# Patient Record
Sex: FEMALE | Race: BLACK OR AFRICAN AMERICAN | Employment: UNEMPLOYED | URBAN - METROPOLITAN AREA
[De-identification: names, ages, dates, MRNs, and addresses within clinical notes are randomized per-mention and may not be internally consistent; named-entity substitution may affect disease eponyms.]

---

## 2020-07-20 ENCOUNTER — HOSPITAL ENCOUNTER (EMERGENCY)
Age: 65
Discharge: PSYCHIATRIC OR SUBSTANCE USE FACILITY | End: 2020-07-21
Attending: EMERGENCY MEDICINE | Admitting: EMERGENCY MEDICINE
Payer: MEDICARE

## 2020-07-20 DIAGNOSIS — F31.2 BIPOLAR AFFECTIVE DISORDER, CURRENT EPISODE MANIC WITH PSYCHOTIC SYMPTOMS (HCC): Primary | ICD-10-CM

## 2020-07-20 DIAGNOSIS — E87.6 HYPOKALEMIA: ICD-10-CM

## 2020-07-20 PROCEDURE — 99285 EMERGENCY DEPT VISIT HI MDM: CPT

## 2020-07-20 PROCEDURE — 90791 PSYCH DIAGNOSTIC EVALUATION: CPT

## 2020-07-20 RX ORDER — INSULIN ASPART 100 [IU]/ML
33 INJECTION, SUSPENSION SUBCUTANEOUS
COMMUNITY
End: 2020-07-31

## 2020-07-20 RX ORDER — IBUPROFEN 200 MG
TABLET ORAL
Status: ON HOLD | COMMUNITY
End: 2020-07-21

## 2020-07-20 RX ORDER — HYDROCHLOROTHIAZIDE 12.5 MG/1
12.5 TABLET ORAL DAILY
Status: ON HOLD | COMMUNITY
End: 2020-07-21

## 2020-07-20 RX ORDER — OMEPRAZOLE 20 MG/1
20 CAPSULE, DELAYED RELEASE ORAL
COMMUNITY

## 2020-07-20 RX ORDER — METOPROLOL TARTRATE 100 MG/1
TABLET ORAL DAILY
Status: ON HOLD | COMMUNITY
End: 2020-07-21

## 2020-07-20 RX ORDER — LITHIUM CARBONATE 300 MG/1
CAPSULE ORAL
Status: ON HOLD | COMMUNITY
End: 2020-07-21

## 2020-07-20 RX ORDER — LITHIUM CARBONATE 150 MG/1
150 CAPSULE ORAL DAILY
Status: ON HOLD | COMMUNITY
End: 2020-07-21

## 2020-07-21 ENCOUNTER — HOSPITAL ENCOUNTER (INPATIENT)
Age: 65
LOS: 10 days | Discharge: HOME | DRG: 885 | End: 2020-07-31
Attending: PSYCHIATRY & NEUROLOGY | Admitting: PSYCHIATRY & NEUROLOGY
Payer: MEDICARE

## 2020-07-21 VITALS
DIASTOLIC BLOOD PRESSURE: 81 MMHG | RESPIRATION RATE: 18 BRPM | HEART RATE: 100 BPM | TEMPERATURE: 98.6 F | SYSTOLIC BLOOD PRESSURE: 131 MMHG | OXYGEN SATURATION: 96 %

## 2020-07-21 PROBLEM — F31.10 BIPOLAR DISEASE, MANIC (HCC): Status: ACTIVE | Noted: 2020-07-21

## 2020-07-21 LAB
ALBUMIN SERPL-MCNC: 3.6 G/DL (ref 3.5–5)
ALBUMIN/GLOB SERPL: 1 {RATIO} (ref 1.1–2.2)
ALP SERPL-CCNC: 92 U/L (ref 45–117)
ALT SERPL-CCNC: 67 U/L (ref 12–78)
AMPHET UR QL SCN: NEGATIVE
ANION GAP SERPL CALC-SCNC: 7 MMOL/L (ref 5–15)
APPEARANCE UR: CLEAR
AST SERPL-CCNC: 60 U/L (ref 15–37)
BACTERIA URNS QL MICRO: NEGATIVE /HPF
BARBITURATES UR QL SCN: NEGATIVE
BASOPHILS # BLD: 0 K/UL (ref 0–0.1)
BASOPHILS NFR BLD: 0 % (ref 0–1)
BENZODIAZ UR QL: NEGATIVE
BILIRUB SERPL-MCNC: 0.4 MG/DL (ref 0.2–1)
BILIRUB UR QL: NEGATIVE
BUN SERPL-MCNC: 21 MG/DL (ref 6–20)
BUN/CREAT SERPL: 14 (ref 12–20)
CALCIUM SERPL-MCNC: 9.8 MG/DL (ref 8.5–10.1)
CANNABINOIDS UR QL SCN: NEGATIVE
CHLORIDE SERPL-SCNC: 110 MMOL/L (ref 97–108)
CO2 SERPL-SCNC: 24 MMOL/L (ref 21–32)
COCAINE UR QL SCN: NEGATIVE
COLOR UR: ABNORMAL
COMMENT, HOLDF: NORMAL
CREAT SERPL-MCNC: 1.54 MG/DL (ref 0.55–1.02)
DATE LAST DOSE: ABNORMAL
DIFFERENTIAL METHOD BLD: ABNORMAL
DRUG SCRN COMMENT,DRGCM: NORMAL
EOSINOPHIL # BLD: 0.2 K/UL (ref 0–0.4)
EOSINOPHIL NFR BLD: 2 % (ref 0–7)
EPITH CASTS URNS QL MICRO: ABNORMAL /LPF
ERYTHROCYTE [DISTWIDTH] IN BLOOD BY AUTOMATED COUNT: 13 % (ref 11.5–14.5)
ETHANOL SERPL-MCNC: <10 MG/DL
GLOBULIN SER CALC-MCNC: 3.6 G/DL (ref 2–4)
GLUCOSE BLD STRIP.AUTO-MCNC: 156 MG/DL (ref 65–100)
GLUCOSE BLD STRIP.AUTO-MCNC: 41 MG/DL (ref 65–100)
GLUCOSE BLD STRIP.AUTO-MCNC: 51 MG/DL (ref 65–100)
GLUCOSE BLD STRIP.AUTO-MCNC: 70 MG/DL (ref 65–100)
GLUCOSE BLD STRIP.AUTO-MCNC: 70 MG/DL (ref 65–100)
GLUCOSE BLD STRIP.AUTO-MCNC: 94 MG/DL (ref 65–100)
GLUCOSE SERPL-MCNC: 105 MG/DL (ref 65–100)
GLUCOSE UR STRIP.AUTO-MCNC: NEGATIVE MG/DL
HCT VFR BLD AUTO: 40 % (ref 35–47)
HGB BLD-MCNC: 13.3 G/DL (ref 11.5–16)
HGB UR QL STRIP: NEGATIVE
IMM GRANULOCYTES # BLD AUTO: 0 K/UL (ref 0–0.04)
IMM GRANULOCYTES NFR BLD AUTO: 0 % (ref 0–0.5)
KETONES UR QL STRIP.AUTO: NEGATIVE MG/DL
LEUKOCYTE ESTERASE UR QL STRIP.AUTO: ABNORMAL
LITHIUM SERPL-SCNC: 0.44 MMOL/L (ref 0.6–1.2)
LYMPHOCYTES # BLD: 2 K/UL (ref 0.8–3.5)
LYMPHOCYTES NFR BLD: 18 % (ref 12–49)
MCH RBC QN AUTO: 30.7 PG (ref 26–34)
MCHC RBC AUTO-ENTMCNC: 33.3 G/DL (ref 30–36.5)
MCV RBC AUTO: 92.4 FL (ref 80–99)
METHADONE UR QL: NEGATIVE
MONOCYTES # BLD: 0.8 K/UL (ref 0–1)
MONOCYTES NFR BLD: 7 % (ref 5–13)
NEUTS SEG # BLD: 8.3 K/UL (ref 1.8–8)
NEUTS SEG NFR BLD: 73 % (ref 32–75)
NITRITE UR QL STRIP.AUTO: NEGATIVE
NRBC # BLD: 0 K/UL (ref 0–0.01)
NRBC BLD-RTO: 0 PER 100 WBC
OPIATES UR QL: NEGATIVE
PCP UR QL: NEGATIVE
PH UR STRIP: 5 [PH] (ref 5–8)
PLATELET # BLD AUTO: 322 K/UL (ref 150–400)
PMV BLD AUTO: 9.5 FL (ref 8.9–12.9)
POTASSIUM SERPL-SCNC: 3.1 MMOL/L (ref 3.5–5.1)
PROT SERPL-MCNC: 7.2 G/DL (ref 6.4–8.2)
PROT UR STRIP-MCNC: NEGATIVE MG/DL
RBC # BLD AUTO: 4.33 M/UL (ref 3.8–5.2)
RBC #/AREA URNS HPF: ABNORMAL /HPF (ref 0–5)
REPORTED DOSE,DOSE: ABNORMAL UNITS
REPORTED DOSE/TIME,TMG: ABNORMAL
SAMPLES BEING HELD,HOLD: NORMAL
SERVICE CMNT-IMP: ABNORMAL
SERVICE CMNT-IMP: NORMAL
SODIUM SERPL-SCNC: 141 MMOL/L (ref 136–145)
SP GR UR REFRACTOMETRY: 1.01 (ref 1–1.03)
UR CULT HOLD, URHOLD: NORMAL
UROBILINOGEN UR QL STRIP.AUTO: 0.2 EU/DL (ref 0.2–1)
WBC # BLD AUTO: 11.4 K/UL (ref 3.6–11)
WBC URNS QL MICRO: ABNORMAL /HPF (ref 0–4)

## 2020-07-21 PROCEDURE — 74011250637 HC RX REV CODE- 250/637: Performed by: STUDENT IN AN ORGANIZED HEALTH CARE EDUCATION/TRAINING PROGRAM

## 2020-07-21 PROCEDURE — 65220000001 HC RM PRIVATE PSYCH

## 2020-07-21 PROCEDURE — 80053 COMPREHEN METABOLIC PANEL: CPT

## 2020-07-21 PROCEDURE — 81001 URINALYSIS AUTO W/SCOPE: CPT

## 2020-07-21 PROCEDURE — 74011250637 HC RX REV CODE- 250/637: Performed by: EMERGENCY MEDICINE

## 2020-07-21 PROCEDURE — 36415 COLL VENOUS BLD VENIPUNCTURE: CPT

## 2020-07-21 PROCEDURE — 80307 DRUG TEST PRSMV CHEM ANLYZR: CPT

## 2020-07-21 PROCEDURE — 74011250637 HC RX REV CODE- 250/637: Performed by: PSYCHIATRY & NEUROLOGY

## 2020-07-21 PROCEDURE — 82962 GLUCOSE BLOOD TEST: CPT

## 2020-07-21 PROCEDURE — 74011000250 HC RX REV CODE- 250: Performed by: STUDENT IN AN ORGANIZED HEALTH CARE EDUCATION/TRAINING PROGRAM

## 2020-07-21 PROCEDURE — 80178 ASSAY OF LITHIUM: CPT

## 2020-07-21 PROCEDURE — 85025 COMPLETE CBC W/AUTO DIFF WBC: CPT

## 2020-07-21 RX ORDER — HYDROCHLOROTHIAZIDE 25 MG/1
12.5 TABLET ORAL DAILY
Status: DISCONTINUED | OUTPATIENT
Start: 2020-07-22 | End: 2020-07-21

## 2020-07-21 RX ORDER — ACETAMINOPHEN 325 MG/1
650 TABLET ORAL
Status: DISCONTINUED | OUTPATIENT
Start: 2020-07-21 | End: 2020-07-22

## 2020-07-21 RX ORDER — LITHIUM CARBONATE 150 MG/1
150 CAPSULE ORAL
Status: DISCONTINUED | OUTPATIENT
Start: 2020-07-22 | End: 2020-07-31 | Stop reason: HOSPADM

## 2020-07-21 RX ORDER — LITHIUM CARBONATE 300 MG/1
300 CAPSULE ORAL
COMMUNITY
End: 2020-07-31

## 2020-07-21 RX ORDER — MOMETASONE FUROATE 50 UG/1
2 SPRAY, METERED NASAL
COMMUNITY

## 2020-07-21 RX ORDER — LORAZEPAM 0.5 MG/1
0.5 TABLET ORAL
Status: COMPLETED | OUTPATIENT
Start: 2020-07-21 | End: 2020-07-21

## 2020-07-21 RX ORDER — LITHIUM CARBONATE 300 MG/1
300 CAPSULE ORAL
Status: DISCONTINUED | OUTPATIENT
Start: 2020-07-21 | End: 2020-07-31 | Stop reason: HOSPADM

## 2020-07-21 RX ORDER — ADHESIVE BANDAGE
30 BANDAGE TOPICAL DAILY PRN
Status: DISCONTINUED | OUTPATIENT
Start: 2020-07-21 | End: 2020-07-31 | Stop reason: HOSPADM

## 2020-07-21 RX ORDER — DEXTROSE MONOHYDRATE 100 MG/ML
0-250 INJECTION, SOLUTION INTRAVENOUS AS NEEDED
Status: DISCONTINUED | OUTPATIENT
Start: 2020-07-21 | End: 2020-07-31 | Stop reason: HOSPADM

## 2020-07-21 RX ORDER — LORAZEPAM 0.5 MG/1
1 TABLET ORAL
Status: COMPLETED | OUTPATIENT
Start: 2020-07-21 | End: 2020-07-21

## 2020-07-21 RX ORDER — MONTELUKAST SODIUM 10 MG/1
10 TABLET ORAL
Status: DISCONTINUED | OUTPATIENT
Start: 2020-07-21 | End: 2020-07-31 | Stop reason: HOSPADM

## 2020-07-21 RX ORDER — HYDROCHLOROTHIAZIDE 12.5 MG/1
12.5 TABLET ORAL DAILY
COMMUNITY
End: 2020-07-31

## 2020-07-21 RX ORDER — INSULIN LISPRO 100 [IU]/ML
INJECTION, SOLUTION INTRAVENOUS; SUBCUTANEOUS
Status: DISCONTINUED | OUTPATIENT
Start: 2020-07-21 | End: 2020-07-21

## 2020-07-21 RX ORDER — MAGNESIUM SULFATE 100 %
4 CRYSTALS MISCELLANEOUS AS NEEDED
Status: DISCONTINUED | OUTPATIENT
Start: 2020-07-21 | End: 2020-07-31 | Stop reason: HOSPADM

## 2020-07-21 RX ORDER — AMLODIPINE BESYLATE 5 MG/1
5 TABLET ORAL DAILY
Status: DISCONTINUED | OUTPATIENT
Start: 2020-07-21 | End: 2020-07-31 | Stop reason: HOSPADM

## 2020-07-21 RX ORDER — BENZTROPINE MESYLATE 1 MG/1
0.5 TABLET ORAL
Status: DISCONTINUED | OUTPATIENT
Start: 2020-07-21 | End: 2020-07-31 | Stop reason: HOSPADM

## 2020-07-21 RX ORDER — INSULIN ASPART 100 [IU]/ML
30 INJECTION, SUSPENSION SUBCUTANEOUS
COMMUNITY
End: 2020-07-31

## 2020-07-21 RX ORDER — METOPROLOL SUCCINATE 50 MG/1
100 TABLET, EXTENDED RELEASE ORAL DAILY
Status: DISCONTINUED | OUTPATIENT
Start: 2020-07-22 | End: 2020-07-31 | Stop reason: HOSPADM

## 2020-07-21 RX ORDER — HALOPERIDOL 5 MG/ML
2.5 INJECTION INTRAMUSCULAR
Status: DISCONTINUED | OUTPATIENT
Start: 2020-07-21 | End: 2020-07-31 | Stop reason: HOSPADM

## 2020-07-21 RX ORDER — LATANOPROST 50 UG/ML
1 SOLUTION/ DROPS OPHTHALMIC EVERY EVENING
Status: DISCONTINUED | OUTPATIENT
Start: 2020-07-21 | End: 2020-07-31 | Stop reason: HOSPADM

## 2020-07-21 RX ORDER — POTASSIUM CHLORIDE 750 MG/1
20 TABLET, FILM COATED, EXTENDED RELEASE ORAL
Status: DISCONTINUED | OUTPATIENT
Start: 2020-07-21 | End: 2020-07-21 | Stop reason: HOSPADM

## 2020-07-21 RX ORDER — LITHIUM CARBONATE 150 MG/1
150 CAPSULE ORAL
Status: ON HOLD | COMMUNITY
End: 2020-07-21

## 2020-07-21 RX ORDER — DEXTROSE 50 % IN WATER (D50W) INTRAVENOUS SYRINGE
12.5-25 AS NEEDED
Status: DISCONTINUED | OUTPATIENT
Start: 2020-07-21 | End: 2020-07-22 | Stop reason: SDUPTHER

## 2020-07-21 RX ORDER — LATANOPROST 50 UG/ML
1 SOLUTION/ DROPS OPHTHALMIC
COMMUNITY

## 2020-07-21 RX ORDER — LITHIUM CARBONATE 150 MG/1
150 CAPSULE ORAL DAILY
COMMUNITY
End: 2020-07-31

## 2020-07-21 RX ORDER — MONTELUKAST SODIUM 10 MG/1
10 TABLET ORAL
COMMUNITY

## 2020-07-21 RX ORDER — MAGNESIUM SULFATE 100 %
4 CRYSTALS MISCELLANEOUS AS NEEDED
Status: DISCONTINUED | OUTPATIENT
Start: 2020-07-21 | End: 2020-07-21

## 2020-07-21 RX ORDER — OLANZAPINE 2.5 MG/1
2.5 TABLET ORAL
Status: DISCONTINUED | OUTPATIENT
Start: 2020-07-21 | End: 2020-07-31 | Stop reason: HOSPADM

## 2020-07-21 RX ORDER — LITHIUM CARBONATE 150 MG/1
150 CAPSULE ORAL DAILY
Status: ON HOLD | COMMUNITY
End: 2020-07-21

## 2020-07-21 RX ORDER — INSULIN LISPRO 100 [IU]/ML
INJECTION, SOLUTION INTRAVENOUS; SUBCUTANEOUS
Status: DISCONTINUED | OUTPATIENT
Start: 2020-07-21 | End: 2020-07-31 | Stop reason: HOSPADM

## 2020-07-21 RX ORDER — PANTOPRAZOLE SODIUM 40 MG/1
40 TABLET, DELAYED RELEASE ORAL
Status: DISCONTINUED | OUTPATIENT
Start: 2020-07-22 | End: 2020-07-31 | Stop reason: HOSPADM

## 2020-07-21 RX ORDER — DIPHENHYDRAMINE HYDROCHLORIDE 50 MG/ML
25 INJECTION, SOLUTION INTRAMUSCULAR; INTRAVENOUS
Status: DISCONTINUED | OUTPATIENT
Start: 2020-07-21 | End: 2020-07-22

## 2020-07-21 RX ORDER — METOPROLOL TARTRATE 50 MG/1
100 TABLET ORAL DAILY
Status: DISCONTINUED | OUTPATIENT
Start: 2020-07-22 | End: 2020-07-21

## 2020-07-21 RX ADMIN — MONTELUKAST SODIUM 10 MG: 10 TABLET, FILM COATED ORAL at 21:38

## 2020-07-21 RX ADMIN — LATANOPROST 1 DROP: 50 SOLUTION OPHTHALMIC at 17:53

## 2020-07-21 RX ADMIN — LITHIUM CARBONATE 300 MG: 300 CAPSULE, GELATIN COATED ORAL at 17:52

## 2020-07-21 RX ADMIN — LORAZEPAM 0.5 MG: 0.5 TABLET ORAL at 02:52

## 2020-07-21 RX ADMIN — AMLODIPINE BESYLATE 5 MG: 5 TABLET ORAL at 17:53

## 2020-07-21 RX ADMIN — LORAZEPAM 0.5 MG: 0.5 TABLET ORAL at 03:02

## 2020-07-21 RX ADMIN — POTASSIUM BICARBONATE 40 MEQ: 782 TABLET, EFFERVESCENT ORAL at 17:52

## 2020-07-21 RX ADMIN — OLANZAPINE 2.5 MG: 2.5 TABLET, FILM COATED ORAL at 16:06

## 2020-07-21 NOTE — ED NOTES
0745:  Bedside verbal shift change report received from Ronaldo, [facility]. Reviewed patient status, lab results and medical plan. Will continue to monitor patient. 0830:  [place] report called to SINDY Wright. Will updated once TDO has arrived. Meal tray ordered for patient. 0908:  Patient resting in bed, lights dimmed, will continue to monitor patient.

## 2020-07-21 NOTE — ED TRIAGE NOTES
Patient arrived from home with sister. Sister states she patient has been acting \"manic\" for the last couple of weeks. Has not slept recently and acting differently. Patient denies any hallucinations or hearing voices, but sister states that patient has had some of these episodes happen recently. Has been taking medications as prescribed. No teeth present, hard to understand patient speech. Pt very restless and agitated during triage. No POA.  Psychiatrist Dr. Dinesh Salgado, has appointment tomorrow at 1:15pm.

## 2020-07-21 NOTE — GROUP NOTE
ARNALDO  GROUP DOCUMENTATION INDIVIDUAL                                                                          Group Therapy Note    Date: 7/21/2020    Group Start Time: 1500  Group End Time: 1600  Group Topic: Recreational/Music Therapy    [address] [facility] 3 ACUTE BEHAV [facility], [address] [facility] GROUP DOCUMENTATION GROUP    Group Therapy Note    Attendees: 7         Attendance: Attended    Patient's Goal:  To concentrate on selected task    Interventions/techniques: Supported-crafts,games,music    Follows Directions:     Interactions: minimal    Mental Status: Flat    Behavior/appearance: Needed prompting    Goals Achieved:pt arrived at the end of session-quiet-was offered and accepted refreshments       Additional Notes:      Amanda Castro

## 2020-07-21 NOTE — ED NOTES
Officers arrived to serve TDO paperwork and transport patient to [facility] [place].

## 2020-07-21 NOTE — CONSULTS
Hospitalist Admission Note    NAME: Oniel Jade   :  1955   MRN:  [id number]   Room Number: 734/01  @ [facility]     Date/Time:  2020 4:47 PM    Patient PCP: Mamie Love MD  ______________________________________________________________________  Given the patient's current clinical presentation, I have a high level of concern for decompensation if discharged from the emergency department. Complex decision making was performed, which includes reviewing the patient's available past medical records, laboratory results, and x-ray films. My assessment of this patient's clinical condition and my plan of care is as follows. Assessment / Plan: Active Problems:    Bipolar disease, manic ([address] (2020)      Type 2 diabetes mellitus on insulin   No results found for: HBA1C, HGBE8, IJH1FCIH, BTV5BLXT  Home regimen : NPH/aspart 70/30 mix 33 units before breakfast, 30 units before dinner.     - NPH 21 units BID sc, lispro sliding scale. - Diabetic diet, hypoglycemia protocol, POC Glucose AC HS, check A1c     Elevated creatinine   Unknown baseline creatinine. Current Cr 1.54, CrCL 34.5 ml/min. Could be pre-renal due to decreased oral intake in manic state.     - Cautious use of lithium due to renal impairment (avoid use with CrCl<30 ml/min). Current lithium level 0.44 subtherapeutic, likely non compliance. - Recheck Cr tomorrow  - Avoid nephrotoxic meds, renally dose meds  - Encourage oral hydration       Hypertension   - Start amlodipine 5mg daily.   - HCTZ is not a good medication for her to be on with concurrent lithium use (decreases excretion of lithium) and increased lithium levels. GERD  - ppi     Primary open angle glaucoma :   Continue latanoprost   Medical Clearance for psychiatric admission      -Psychiatric treatment and management of health issues,Defer to psychiatrist for further management. -Medically stable at this time.   We will follow up on a p.r.n. basis.  -No VTE prophylaxis indicated or warranted at this time. Subjective:   CHIEF COMPLAINT: giles    HISTORY OF PRESENT ILLNESS:     Adalberto Wood is a 77 y.o.   female with PMH of bipolar disorder, diabetes, HTN who presents to ED with c/o grossly manic behavior, agitation, insomnia, pressured speech, impulsivity. Currently in a manic state, hyperverbal, difficult to comprehend what she is saying because of lack of dentures. She was talking to her sister over the phone agitated about how she has not eaten all day and has not been given insulin. I spoke with the sister over the phone who told me that patient is very strict about sticking to a routine and becomes upset if unable to do so. Patient's premeal blood sugar is in the 70s and does not require insulin. Patient is adamant about getting insulin then eating but then changes her narrative to say she now will eat first and then take insulin. Patient denies any past medical history except as listed above. Denies fever,chills,chest pain, sob,abd pan,diarrhea,constipation,lighhadedness. No current medical concerns at this time. Past Medical History:   Diagnosis Date    Bipolar affective ([facility]     Diabetes ([facility]     Hypertension     Lymphoma ([facility]     Psychiatric disorder         No past surgical history on file. Social History     Tobacco Use    Smoking status: Former Smoker     Last attempt to quit:      Years since quittin.5    Smokeless tobacco: Never Used   Substance Use Topics    Alcohol use: Never     Frequency: Never        No family history on file.   Allergies   Allergen Reactions    Codeine Unknown (comments)    Other Medication Unknown (comments)     States unable to tolerate oral diabetes medications    Penicillins Unknown (comments)    Statins-Hmg-Coa Reductase Inhibitors Other (comments)     Muscle cramps    Sulfa (Sulfonamide Antibiotics) Unknown (comments)        Prior to Admission medications    Medication Sig Start Date End Date Taking? Authorizing Provider   hydroCHLOROthiazide (HYDRODIURIL) 12.5 mg tablet Take 12.5 mg by mouth daily. Indications: high blood pressure   Yes Provider, Historical   insulin aspart protamine/insulin aspart (NovoLOG Mix 70-30 U-100 Insuln) 100 unit/mL (70-30) injection 30 Units by SubCUTAneous route Daily (before dinner). Indications: type 2 diabetes mellitus   Yes Provider, Historical   lithium carbonate 300 mg capsule Take 300 mg by mouth nightly. Indications: bipolar   Yes Provider, Historical   lithium carbonate 150 mg capsule Take 150 mg by mouth daily. Indications: bipolar   Yes Provider, Historical   mometasone (NASONEX) 50 mcg/actuation nasal spray 2 Sprays by Both Nostrils route daily as needed (allergies). Yes Provider, Historical   omeprazole (PRILOSEC) 20 mg capsule Take 20 mg by mouth daily as needed (GERD). Yes Other, MD Mohan   insulin aspart protamine/insulin aspart (NovoLOG Mix 70-30 U-100 Insuln) 100 unit/mL (70-30) injection 33 Units by SubCUTAneous route Daily (before breakfast). Indications: type 2 diabetes mellitus   Yes Other, MD Mohan   latanoprost (XALATAN) 0.005 % ophthalmic solution Administer 1 Drop to both eyes nightly. Indications: glaucoma    Provider, Historical   montelukast (Singulair) 10 mg tablet Take 10 mg by mouth nightly. Indications: seasonal runny nose    Provider, Historical       REVIEW OF SYSTEMS:        I am not able to complete the review of systems because:    The patient is intubated and sedated    The patient has altered mental status due to his acute medical problems    The patient has baseline aphasia from prior stroke(s)    The patient has baseline dementia and is not reliable historian    The patient is in acute medical distress and unable to provide information   x Patient has Acute giles, distractible       Total of 12 systems reviewed as follows:       POSITIVE= underlined text  Negative = text not underlined  General:  fever, chills, sweats, generalized weakness, weight loss/gain,      loss of appetite   Eyes:    blurred vision, eye pain, loss of vision, double vision  ENT:    rhinorrhea, pharyngitis   Respiratory:   cough, sputum production, SOB, CASON, wheezing, pleuritic pain   Cardiology:   chest pain, palpitations, orthopnea, PND, edema, syncope   Gastrointestinal:  abdominal pain , N/V, diarrhea, dysphagia, constipation, bleeding   Genitourinary:  frequency, urgency, dysuria, hematuria, incontinence   Muskuloskeletal :  arthralgia, myalgia, back pain  Hematology:  easy bruising, nose or gum bleeding, lymphadenopathy   Dermatological: rash, ulceration, pruritis, color change / jaundice  Endocrine:   hot flashes or polydipsia   Neurological:  headache, dizziness, confusion, focal weakness, paresthesia,     Speech difficulties, memory loss, gait difficulty  Psychological: Feelings of anxiety, depression, agitation    Objective:   VITALS:    Visit Vitals  /49 (BP 1 Location: Left arm, BP Patient Position: Sitting)   Pulse (!) 102   Temp 98.4 °F (36.9 °C)   Resp 20   Ht 5' 3\" (1.6 m)   Wt 75.3 kg (166 lb)   SpO2 99%   BMI 29.41 kg/m²       PHYSICAL EXAM:    General:    Alert, cooperative, no distress, appears stated age. HEENT: Atraumatic, anicteric sclerae, pink conjunctivae     No oral ulcers, mucosa moist, throat clear, dentition fair  Neck:  Supple, symmetrical,  no JVD, thyroid: non tender  Lungs:   Clear to auscultation bilaterally. No Wheezing or Rhonchi. No rales. Chest wall:  No tenderness  No Accessory muscle use. Heart:   Regular  rhythm,  No  murmur   No edema  Abdomen:   Soft, non-tender. Not distended. Bowel sounds normal  Extremities: No cyanosis. No clubbing,      Skin turgor normal, Capillary refill normal, Radial dial pulse 2+  Skin:     Not pale.   Not Jaundiced  No rashes   Psychiatric:   Mood: hostile, anxious, irritable and labile  Affect: variable  Thoughts: flight of ideas  Speech: pressured  Sensorium: No A/V hallucinations  Safety: no SI/HI    Neurologic: EOMs intact. No facial asymmetry. No aphasia or slurred speech. Symmetrical strength, Sensation grossly intact. Alert and oriented X 4.     ______________________________________________________________________    Care Plan discussed with:  Nurse    Expected  Disposition: per primary attending   ________________________________________________________________________  TOTAL TIME:  25 Minutes    Critical Care Provided     Minutes non procedure based      Comments     Reviewed previous records   >50% of visit spent in counseling and coordination of care  Discussion with patient and/or family and questions answered       ________________________________________________________________________  Signed: Michelle Fermin MD    Procedures: see electronic medical records for all procedures/Xrays and details which were not copied into this note but were reviewed prior to creation of Plan. LAB DATA REVIEWED:    Recent Results (from the past 24 hour(s))   CBC WITH AUTOMATED DIFF    Collection Time: 07/21/20 12:48 AM   Result Value Ref Range    WBC 11.4 (H) 3.6 - 11.0 K/uL    RBC 4.33 3.80 - 5.20 M/uL    HGB 13.3 11.5 - 16.0 g/dL    HCT 40.0 35.0 - 47.0 %    MCV 92.4 80.0 - 99.0 FL    MCH 30.7 26.0 - 34.0 PG    MCHC 33.3 30.0 - 36.5 g/dL    RDW 13.0 11.5 - 14.5 %    PLATELET 277 286 - 816 K/uL    MPV 9.5 8.9 - 12.9 FL    NRBC 0.0 0  WBC    ABSOLUTE NRBC 0.00 0.00 - 0.01 K/uL    NEUTROPHILS 73 32 - 75 %    LYMPHOCYTES 18 12 - 49 %    MONOCYTES 7 5 - 13 %    EOSINOPHILS 2 0 - 7 %    BASOPHILS 0 0 - 1 %    IMMATURE GRANULOCYTES 0 0.0 - 0.5 %    ABS. NEUTROPHILS 8.3 (H) 1.8 - 8.0 K/UL    ABS. LYMPHOCYTES 2.0 0.8 - 3.5 K/UL    ABS. MONOCYTES 0.8 0.0 - 1.0 K/UL    ABS. EOSINOPHILS 0.2 0.0 - 0.4 K/UL    ABS. BASOPHILS 0.0 0.0 - 0.1 K/UL    ABS. IMM.  GRANS. 0.0 0.00 - 0.04 K/UL    DF AUTOMATED     METABOLIC PANEL, COMPREHENSIVE Collection Time: 07/21/20 12:48 AM   Result Value Ref Range    Sodium 141 136 - 145 mmol/L    Potassium 3.1 (L) 3.5 - 5.1 mmol/L    Chloride 110 (H) 97 - 108 mmol/L    CO2 24 21 - 32 mmol/L    Anion gap 7 5 - 15 mmol/L    Glucose 105 (H) 65 - 100 mg/dL    BUN 21 (H) 6 - 20 MG/DL    Creatinine 1.54 (H) 0.55 - 1.02 MG/DL    BUN/Creatinine ratio 14 12 - 20      GFR est AA 41 (L) >60 ml/min/1.73m2    GFR est non-AA 34 (L) >60 ml/min/1.73m2    Calcium 9.8 8.5 - 10.1 MG/DL    Bilirubin, total 0.4 0.2 - 1.0 MG/DL    ALT (SGPT) 67 12 - 78 U/L    AST (SGOT) 60 (H) 15 - 37 U/L    Alk. phosphatase 92 45 - 117 U/L    Protein, total 7.2 6.4 - 8.2 g/dL    Albumin 3.6 3.5 - 5.0 g/dL    Globulin 3.6 2.0 - 4.0 g/dL    A-G Ratio 1.0 (L) 1.1 - 2.2     LITHIUM    Collection Time: 07/21/20 12:48 AM   Result Value Ref Range    Lithium level 0.44 (L) 0.60 - 1.20 MMOL/L    Reported dose date: NOT PROVIDED      Reported dose time: NOT PROVIDED      Reported dose: NOT PROVIDED UNITS   ETHYL ALCOHOL    Collection Time: 07/21/20 12:48 AM   Result Value Ref Range    ALCOHOL(ETHYL),SERUM <10 <10 MG/DL   SAMPLES BEING HELD    Collection Time: 07/21/20 12:48 AM   Result Value Ref Range    SAMPLES BEING HELD 1BLU     COMMENT        Add-on orders for these samples will be processed based on acceptable specimen integrity and analyte stability, which may vary by analyte.    DRUG SCREEN, URINE    Collection Time: 07/21/20  5:48 AM   Result Value Ref Range    AMPHETAMINES Negative NEG      BARBITURATES Negative NEG      BENZODIAZEPINES Negative NEG      COCAINE Negative NEG      METHADONE Negative NEG      OPIATES Negative NEG      PCP(PHENCYCLIDINE) Negative NEG      THC (TH-CANNABINOL) Negative NEG      Drug screen comment (NOTE)    URINALYSIS W/MICROSCOPIC    Collection Time: 07/21/20  5:48 AM   Result Value Ref Range    Color YELLOW/STRAW      Appearance CLEAR CLEAR      Specific gravity 1.012 1.003 - 1.030      pH (UA) 5.0 5.0 - 8.0      Protein Negative NEG mg/dL    Glucose Negative NEG mg/dL    Ketone Negative NEG mg/dL    Bilirubin Negative NEG      Blood Negative NEG      Urobilinogen 0.2 0.2 - 1.0 EU/dL    Nitrites Negative NEG      Leukocyte Esterase SMALL (A) NEG      WBC 0-4 0 - 4 /hpf    RBC 0-5 0 - 5 /hpf    Epithelial cells FEW FEW /lpf    Bacteria Negative NEG /hpf   URINE CULTURE HOLD SAMPLE    Collection Time: 07/21/20  5:48 AM    Specimen: Serum; Urine   Result Value Ref Range    Urine culture hold        Urine on hold in Microbiology dept for 2 days. If unpreserved urine is submitted, it cannot be used for addtional testing after 24 hours, recollection will be required.    GLUCOSE, POC    Collection Time: 07/21/20  4:36 PM   Result Value Ref Range    Glucose (POC) 70 65 - 100 mg/dL    Performed by Mary Avila

## 2020-07-21 NOTE — BH NOTES
Patient came out of her room and into the hallway and asked to speak to this writer. She reports not understanding and knowing why she is here. She was asking when she could see the  and doctor. Explained the unit to the patient and the TDO process. She was easily agitated and very manic. She was talking with her hands and very restless. She was talking nonstop but hard to understand at times due to mumbling and dropping her voice very low and gravelly sounding at times. She reports not wanting to drink the water here because of the chlorine. She reports not wanting to take the medicine because she takes her own medicine. She became upset that this writer could not bring a psychiatrist to her room as treatment team rounds had ended prior to the patient's arrival to the unit. She continued to be upset with this and stated that this is not a hospital and she has been admitted to many facilities before and the doctor can come see her if she wants. Patient was upset and getting more restless so this writer left to decrease patient's frustration. Continued to hear patient talking to herself and making noises but the volume decreased after about a minute. Will follow up with patient tomorrow if she does not attend group.     Jennifer Grigsby LPC [facility]

## 2020-07-21 NOTE — ED NOTES
Pt offered more water, pt accepted and then requested diet pepsi. Diet pepsi provided. Pt drinking soft drink quietly while sitting on chair. reiterated to pt that urine sample was needed and what MD was checking for. Pt states understanding.

## 2020-07-21 NOTE — ED NOTES
Pt speaking with [facility] via telehealth. [employer] officer Kelsey Gutierres at the bedside.

## 2020-07-21 NOTE — ED NOTES
Explained to pt that urine sample was needed. States understanding. Provided with bedside commode. Pt attempting to urinate at this time.

## 2020-07-21 NOTE — ED NOTES
Pt acceptived oral ativan 1 mg and dropped one of the two pills when administering. 0.5mg wasted. Pharmacy called and they stated new order was needed to get 0.5mg pill.

## 2020-07-21 NOTE — ED NOTES
Pt came out of room and was redirected back to her room by charge RN. Pt became agitated and refused to head back to room. Primary RN and [employer] officer Angelia Atwood attempting to help charge RN reorient pt back to room. Pt very agitated, yelling at staff. Pt back in room, continues to yell at staff. [facility]SM[facility] at the bedside giving pt an update on plan of care. Pt aware that she will be consulted by [facility] mental health regarding admission. Pt continues to be agitated, yelling incomprehensible words. geodon and ativan offered, pt yelled \"I will not take that medicine if you bring it here. \" pt requested for RN to leave room. [employer] remains at the bedside with pt.

## 2020-07-21 NOTE — ED NOTES
This clinician was notified by Jovanny Sigala with [facility] that patient will be a TDO. Patient has a bed on hold at [facility] [place], however she needs to give a urine sample.

## 2020-07-21 NOTE — ED NOTES
Increased agitation noted when MD entered room for assessment. LAKISHA, JAK, and MD speaking with sister outside the room at this time.

## 2020-07-21 NOTE — BH NOTES
1320 Patient arrived to [address] [place] [address] in wheelchair. Patient presents with a distracted and defensive attitude, animated affect, and labile mood. Patient's thought process is intrusive thoughts. Patient's speech is excessively loud. Patient arrived from [facility] ER. Per patient's sister; patient has called the police 4-5 times since last Friday. Patient has not slept in two days. Patient also demonstrated aggressive behaviors towards her sister. Patient denied SI, HI, and AVH upon admission. Patient is alert and oriented to self. Patient presents with pressured, rapid speech. This writer noted the patient to be having intrusive thoughts. Patient's thought process is looseness of association. Patient has a history of diabetes type II, lymphoma, and HTN. Patient's UDS was negative. Patient denied a history of sexual, physical, and mental abuse. Patient is a TDO admission. 1606 This writer noted the patient to be responding to internal stimuli. Patient received prn 2.5 mg zyprexa. 1636 Patient's blood sugar is 70. Patient encouraged to eat her dinner. Patient refused; stating that she did not like it. 1720 Patient did not eat her dinner. Patient stated that she did not like her food. Patient continues to talk to self and present with bizarre behaviors. Patient in room licking her pillows. Prn 2.5 mg zyprexa not effective. 1807 Patient's blood sugar is 51. Patient refused apple juice and orange juice. Patient refused glucose tablets. This writer educated the patient on the importance of raising her blood sugar. 1836 Patient's blood sugar is 41. Patient agreed to drink apple juice. Patient continued to refuse glucose tablets and orange juice. Patient drunk two 6.5 oz cans of ginger aleMart [place] Patient's blood sugar is 70.  Upon repeat test, patient's blood sugar is 80'

## 2020-07-21 NOTE — PROGRESS NOTES
[facility]: MED RECONCILIATION    Comments/Recommendations:   Med rec performed via pharmacist call to [facility] pharmacy ([phone]) and interview with RN who spoke with patient regarding medications. Pharmacist unable to speak with patient. Medication list entered as per RN interview and [facility] pharmacy only. Please interpret list with caution. Note major drug interaction between lithium and HCTZ which may result in lithium toxicity. Medications added:     Latanoprost  Singulair  nasonex    Medications removed:    IBU--per RN, patient does not take     Medications adjusted:    Changed novolog 70/30 to 33 units SQ q AM, 30 units SQ q PM as per most recent script from the pharmacy. Changed metoprolol to succinate formulation       Allergies: Codeine; Other medication; Penicillins; Statins-hmg-coa reductase inhibitors; and Sulfa (sulfonamide antibiotics)    Prior to Admission Medications:   Prior to Admission Medications   Prescriptions Last Dose Informant Patient Reported? Taking?   hydroCHLOROthiazide (HYDRODIURIL) 12.5 mg tablet  Other Yes Yes   Sig: Take 12.5 mg by mouth daily. Indications: high blood pressure   insulin aspart protamine/insulin aspart (NovoLOG Mix 70-30 U-100 Insuln) 100 unit/mL (70-30) injection 2020 at Unknown time Other Yes Yes   Si Units by SubCUTAneous route Daily (before breakfast). Indications: type 2 diabetes mellitus   insulin aspart protamine/insulin aspart (NovoLOG Mix 70-30 U-100 Insuln) 100 unit/mL (70-30) injection  Other Yes Yes   Si Units by SubCUTAneous route Daily (before dinner). Indications: type 2 diabetes mellitus   latanoprost (XALATAN) 0.005 % ophthalmic solution  Other Yes No   Sig: Administer 1 Drop to both eyes nightly. Indications: glaucoma   lithium carbonate 150 mg capsule  Other Yes Yes   Sig: Take 150 mg by mouth daily. Indications: bipolar   lithium carbonate 300 mg capsule  Other Yes Yes   Sig: Take 300 mg by mouth nightly.  Indications: bipolar mometasone (NASONEX) 50 mcg/actuation nasal spray  Other Yes Yes   Si Sprays by Both Nostrils route daily as needed (allergies). montelukast (Singulair) 10 mg tablet  Other Yes No   Sig: Take 10 mg by mouth nightly. Indications: seasonal runny nose   omeprazole (PRILOSEC) 20 mg capsule  Other Yes Yes   Sig: Take 20 mg by mouth daily as needed (GERD).       Facility-Administered Medications: None                    Lakeshia Gil,  Daisha Salazar   Contact: [phone]

## 2020-07-21 NOTE — BSMART NOTE
Comprehensive Assessment Form Part 1    Section I - Disposition    Axis I - Bipolar Disorder with Psychotic Features   Axis II - Deferred  Axis III - Diabetes, Lymphoma, Hypertension  Axis IV - Relationship stressors, Pandemic stressors  [place] V - 35      The Medical Doctor to Psychiatrist conference was not completed. The Medical Doctor is in agreement with Psychiatrist disposition because of (reason) patient is not willing to be admitted voluntarily. The plan is request TDO assessment from [facility]. The on-call Psychiatrist consulted was Dr. Jaya Colon. The admitting Psychiatrist will be Dr. Jaya Colon. The admitting Diagnosis is Bipolar Disorder. The Payor source is Medicare. Section II - Integrated Summary  Summary:  Patient is a 77year old female seen face to face in the ER. She was brought to the ER by her sister after patient called the police because she believed there was a bomb in the house. Patient's sister and [place] police report indicate that patient has called police 4-5 times since Friday. Her sister reported on Friday she called police and reported there is a sexual predator in the attic. Patient has a long history of treatment for Bipolar Disorder. Her sister reported she first was hospitalized in  during her freshman year at [employer]. She reportedly spent 3 years at [facility] at that time. She was hospitalized several times after that, but her sister reported she has not been hospitalized since the 1980's. Patient has decompensated significantly over the past several months. Her sister reported she has been sleeping 2-3 hours a night or less for weeks. Her sister reported she does not stop talking while she sleeps or when she is awake. She has reported to her sister that she is hearing voices, and has been observed to be responding to internal stimuli in the ER. She has told her sister there is paranormal activity in their home.   She is easily agitated and will not allow her sister to monitor her medication compliance. She was on multiple psychiatric medications at one point but told her psychiatrist she was stable and wanted to come off 2, so now she is only taking Lithium. According to her labs she is not taking enough for it to be therapeutic. Upon evaluation patient is yelling and constantly moving around the room. She is able to say who her psychiatrist is, but denies having a mental illness. She stated she was hospitalized at [facility] in 1972 and it was illegal and \"I'm going to do something about it. \"  She denied experiencing any mental health symptoms and when it was pointed out that she was agitated and yelling she accused this clinician of harming her sister. She was focused on a stretcher across the ER with nobody in it and insisted that her sister was in it and we had done something to harm her. When this clinician reported to her that her sister was in the ER waiting room and was concerned about her mental status she called her sister a liar. She then went back to talking about her sister being on the stretcher. She admitted to ER staff when she arrived that she has not been sleeping, but denied when asked by this clinician. When asked about her calling the police she called her sister a liar again. When she was informed that police reports had been written about her calls she reported everyone is a liar. When it was told to her that inpatient admission was recommended she adamantly refused to be admitted to the hospital.  The TDO process was explained to her and she stated that it would be illegal for her to be kept against her will no matter what anybody said. The patient has demonstrated mental capacity to provide informed consent. The information is given by the patient, past medical records and sister. The Chief Complaint is mental health problem. The Precipitant Factors are relationship stressors, medical stressors, pandemic.   Previous Hospitalizations: yes  The patient has been in restraints in the past and has not escaped from them. Current Psychiatrist is Dr. Srinivas Krishnan. Lethality Assessment:    The potential for suicide is not noted. The potential for homicide is not noted. The patient has not been a perpetrator of sexual or physical abuse. There are not pending charges. The patient is felt to be at risk for self harm due to inability to care for herself. The attending nurse was advised the patient needs supervision. Section III - Psychosocial  The patient's overall mood and attitude is manic. Feelings of helplessness and hopelessness are not observed. Generalized anxiety is not observed. Panic is not observed. Phobias are not observed. Obsessive compulsive tendencies are not observed. Section IV - Mental Status Exam  The patient's appearance shows no evidence of impairment. The patient's behavior is manic  and shows poor impulse control. The patient is oriented to time, place, person and situation. The patient's speech is pressured and loud. The patient's mood is irritable. The range of affect is labile. The patient's thought content demonstrates paranoia. The thought process shows no evidence of impairment. The patient's perception demonstrated changes in the following:  auditory and visual hallucinations. The patient's memory shows no evidence of impairment. The patient's appetite shows no evidence of impairment. The patient's sleep has evidence of insomnia. The patient shows no insight. The patient's judgement is psychologically impaired. Section V - Substance Abuse  The patient is not using substances. Section VI - Living Arrangements  The patient is single. The patient lives with her sister. The patient has no children. The patient does plan to return home upon discharge. The patient does not have legal issues pending. The patient's source of income comes from social security.   Adventist and cultural practices have not been voiced at this time. The patient's greatest support comes from her sister and this person will be involved with the treatment. The patient has not been in an event described as horrible or outside the realm of ordinary life experience either currently or in the past.  The patient has not been a victim of sexual/physical abuse. Section VII - Other Areas of Clinical Concern  The highest grade achieved is some college with the overall quality of school experience being described as unknown. The patient is currently disabled and speaks [place] as a primary language. The patient has no communication impairments affecting communication. The patient's preference for learning can be described as: can read and write adequately.   The patient's hearing is normal.  The patient's vision is normal.      Ava Torrez MA

## 2020-07-21 NOTE — ED PROVIDER NOTES
HPI     64-year-old female with bipolar disorder, diabetes, hypertension, lymphoma, presents emergency department for manic episode. Patient sister says she has not slept in 2 weeks she is very manic she is becoming aggressive. EMS and police were called to the house tonight because of the aggressive behavior. Is very difficult to understand the patient because she does not have her dentures in. She seems very agitated and will not stop moving or sit down. She was aggressive towards her sister while I was in the room and I had to get the sister out of the room and police at the bedside. Past Medical History:   Diagnosis Date    [facility] affective ([facility]     [facility]     Hypertension     [facility]     Psychiatric disorder        History reviewed. No pertinent surgical history. History reviewed. No pertinent family history.     Social History     Socioeconomic History    Marital status: SINGLE     Spouse name: Not on file    Number of children: Not on file    Years of education: Not on file    Highest education level: Not on file   Occupational History    Not on file   Social Needs    Financial resource strain: Not on file    Food insecurity     Worry: Not on file     Inability: Not on file    Transportation needs     Medical: Not on file     Non-medical: Not on file   Tobacco Use    Smoking status: Former Smoker     Last attempt to quit:      Years since quittin.5    Smokeless tobacco: Never Used   Substance and Sexual Activity    Alcohol use: Never     Frequency: Never    Drug use: Never    Sexual activity: Not on file   Lifestyle    Physical activity     Days per week: Not on file     Minutes per session: Not on file    Stress: Not on file   Relationships    Social connections     Talks on phone: Not on file     Gets together: Not on file     Attends Oriental orthodox service: Not on file     Active member of club or organization: Not on file     Attends meetings of clubs or organizations: Not on file     Relationship status: Not on file    Intimate partner violence     Fear of current or ex partner: Not on file     Emotionally abused: Not on file     Physically abused: Not on file     Forced sexual activity: Not on file   Other Topics Concern    Not on file   Social History Narrative    Not on file         ALLERGIES: Codeine; Other medication; Penicillins; Statins-hmg-coa reductase inhibitors; and Sulfa (sulfonamide antibiotics)    Review of Systems   Unable to perform ROS: Psychiatric disorder       Vitals:    07/20/20 2321   BP: 158/68   Pulse: (!) 109   Resp: 17   Temp: 98.5 °F (36.9 °C)   SpO2: 98%            Physical Exam  Constitutional:       General: She is not in acute distress. Appearance: She is well-developed. HENT:      Head: Normocephalic and atraumatic. Mouth/Throat:      Pharynx: No oropharyngeal exudate. Eyes:      General: No scleral icterus. Right eye: No discharge. Left eye: No discharge. Pupils: Pupils are equal, round, and reactive to light. Neck:      Musculoskeletal: Normal range of motion and neck supple. Vascular: No JVD. Cardiovascular:      Rate and Rhythm: Normal rate. Heart sounds: No murmur. Pulmonary:      Effort: Pulmonary effort is normal. No respiratory distress. Breath sounds: No stridor. No wheezing or rales. Chest:      Chest wall: No tenderness. Abdominal:      General: There is no distension. Palpations: Abdomen is soft. There is no mass. Tenderness: There is no abdominal tenderness. There is no guarding or rebound. Musculoskeletal: Normal range of motion. Skin:     General: Skin is warm and dry. Capillary Refill: Capillary refill takes less than 2 seconds. Findings: No rash. Neurological:      Mental Status: She is oriented to person, place, and time. Psychiatric:         Mood and Affect: Mood is anxious and elated. Affect is angry.          Speech: Speech is rapid and pressured. Behavior: Behavior is agitated and aggressive. [facility]       Procedures      [facility] at bedside as well as [place] [employer].  [facility] requesting we hold off on sedation until [facility] eval.        medically cleared. Does have a mildly elevated WBC and they will not accept her without a urinalysis to r/o UTI. Patient refused Geodon but did take the ativan. She is TDO'd. If we can not get urine, then [facility]. Urine clear. Patient is medically cleared.

## 2020-07-21 NOTE — PROGRESS NOTES
Problem: Dominga/Hypomania  Goal: *LTG: Reduce psychic energy and return to normal activity levels, good judgement, stable mood, and goal-directed behavior  Outcome: Not Progressing Towards Goal  Goal: *LTG: Reduce agitation, impulsivity, and pressured speech  Description: Reduce agitation, impulsivity, and pressured speech while achieving sensitivity to the consequences of behavior and having more realistic expectations. Outcome: Not Progressing Towards Goal  Goal: *LTG: Talk about underlying feelings of low self-esteem or guilt and fears of rejection, dependency, and abandonment  Outcome: Not Progressing Towards Goal  Goal: *LTG: Achieve controlled behavior, moderated  mood, and more deliberative speech and thought processes  Description: Achieve controlled behavior, moderated  mood, and more deliberative speech and thought processes through psychotherapy and medication. Outcome: Not Progressing Towards Goal  Goal: *STG: Sleep about 5 hours or more per night  Outcome: Not Progressing Towards Goal  Goal: *STG: Be less agitated and distracted  Description: Be less agitated and distracted, e.g. be able to sit quietly and calmly for minutes  Outcome: Not Progressing Towards Goal  Goal: *STG: Decrease impulsivity in action  Description: Decrease impulsivity in action, e.g. refrain from engaging in self-destructive behaviors such as over-spending, promiscuity, substance abuse, or use of profane language.   Outcome: Not Progressing Towards Goal  Goal: *STG: Identify fears associated with rejection, failure and abandonment  Outcome: Not Progressing Towards Goal  Goal: *STG: Acknowledge and explore causes for low self-esteem that is covered by grandiosity  Outcome: Not Progressing Towards Goal

## 2020-07-21 NOTE — ED NOTES
Assisted pt to bedside commode. Pt urinated 400 mLs of yellow urine. Pt drowsy and appears unsteady on feet. Assisted pt to bed. Pt layed down. Lights dimmed and provided with blanket for comfort.

## 2020-07-21 NOTE — ED NOTES
Pt attempting to urinate again. RN checked on pt, pt had fallen asleep on commode. SN woke pt up and assisted her to stretcher. Pt refused to lay down. Remained sitting on stretcher.

## 2020-07-21 NOTE — ED NOTES
Pt in the hallway yelling again. HPLINDA and MD with pt attempting to deescalate pt. Pt accepted 2nd ativan. Attempted to reorient pt back into room. Pt walked slowly towards room but appeared to be looking suspiciously inside of room. Pt was asked if she was seeing something or someone in the room and pt became agitated and yelled incoherent speech and slammed her hands and an isolation cart that was next to room entrance. Pt finally went into room.

## 2020-07-22 LAB
GLUCOSE BLD STRIP.AUTO-MCNC: 100 MG/DL (ref 65–100)
GLUCOSE BLD STRIP.AUTO-MCNC: 107 MG/DL (ref 65–100)
GLUCOSE BLD STRIP.AUTO-MCNC: 208 MG/DL (ref 65–100)
GLUCOSE BLD STRIP.AUTO-MCNC: 225 MG/DL (ref 65–100)
GLUCOSE BLD STRIP.AUTO-MCNC: 235 MG/DL (ref 65–100)
SERVICE CMNT-IMP: ABNORMAL
SERVICE CMNT-IMP: NORMAL

## 2020-07-22 PROCEDURE — 82962 GLUCOSE BLOOD TEST: CPT

## 2020-07-22 PROCEDURE — 65220000001 HC RM PRIVATE PSYCH

## 2020-07-22 PROCEDURE — 74011250637 HC RX REV CODE- 250/637: Performed by: STUDENT IN AN ORGANIZED HEALTH CARE EDUCATION/TRAINING PROGRAM

## 2020-07-22 PROCEDURE — 74011250637 HC RX REV CODE- 250/637: Performed by: PSYCHIATRY & NEUROLOGY

## 2020-07-22 PROCEDURE — 74011636637 HC RX REV CODE- 636/637: Performed by: STUDENT IN AN ORGANIZED HEALTH CARE EDUCATION/TRAINING PROGRAM

## 2020-07-22 PROCEDURE — 74011250636 HC RX REV CODE- 250/636: Performed by: PSYCHIATRY & NEUROLOGY

## 2020-07-22 RX ORDER — DIPHENHYDRAMINE HYDROCHLORIDE 50 MG/ML
25 INJECTION, SOLUTION INTRAMUSCULAR; INTRAVENOUS
Status: DISCONTINUED | OUTPATIENT
Start: 2020-07-22 | End: 2020-07-31 | Stop reason: HOSPADM

## 2020-07-22 RX ORDER — FLUTICASONE PROPIONATE 50 MCG
2 SPRAY, SUSPENSION (ML) NASAL DAILY PRN
Status: DISCONTINUED | OUTPATIENT
Start: 2020-07-22 | End: 2020-07-31 | Stop reason: HOSPADM

## 2020-07-22 RX ADMIN — HUMAN INSULIN 20 UNITS: 100 INJECTION, SUSPENSION SUBCUTANEOUS at 08:01

## 2020-07-22 RX ADMIN — HUMAN INSULIN 20 UNITS: 100 INJECTION, SUSPENSION SUBCUTANEOUS at 17:43

## 2020-07-22 RX ADMIN — DIPHENHYDRAMINE HYDROCHLORIDE 25 MG: 50 INJECTION, SOLUTION INTRAMUSCULAR; INTRAVENOUS at 03:13

## 2020-07-22 RX ADMIN — DIPHENHYDRAMINE HYDROCHLORIDE 25 MG: 50 INJECTION, SOLUTION INTRAMUSCULAR; INTRAVENOUS at 11:30

## 2020-07-22 RX ADMIN — METOPROLOL SUCCINATE 50 MG: 50 TABLET, EXTENDED RELEASE ORAL at 07:55

## 2020-07-22 RX ADMIN — AMLODIPINE BESYLATE 5 MG: 5 TABLET ORAL at 07:56

## 2020-07-22 RX ADMIN — INSULIN LISPRO 3 UNITS: 100 INJECTION, SOLUTION INTRAVENOUS; SUBCUTANEOUS at 12:15

## 2020-07-22 RX ADMIN — LITHIUM CARBONATE 150 MG: 150 CAPSULE, GELATIN COATED ORAL at 07:56

## 2020-07-22 RX ADMIN — HALOPERIDOL LACTATE 2.5 MG: 5 INJECTION, SOLUTION INTRAMUSCULAR at 11:21

## 2020-07-22 RX ADMIN — HALOPERIDOL LACTATE 2.5 MG: 5 INJECTION, SOLUTION INTRAMUSCULAR at 03:13

## 2020-07-22 RX ADMIN — LITHIUM CARBONATE 300 MG: 300 CAPSULE, GELATIN COATED ORAL at 17:45

## 2020-07-22 RX ADMIN — PANTOPRAZOLE SODIUM 40 MG: 40 TABLET, DELAYED RELEASE ORAL at 08:15

## 2020-07-22 RX ADMIN — MONTELUKAST SODIUM 10 MG: 10 TABLET, FILM COATED ORAL at 21:06

## 2020-07-22 RX ADMIN — LATANOPROST 1 DROP: 50 SOLUTION OPHTHALMIC at 17:49

## 2020-07-22 NOTE — BH NOTES
Received pt at [address] [place] [address] on 7/21 - pt was in room asleep. Returned to room later and completed assessment. Pt was very agitated with rapid and pressured speech. Very difficulty to understand. Pt denies SI/HI but expresses disdain toward sister - c/o frequently that her sister put her in this place and she shouldn't have. Pt is visibly experiencing delusions, auditory and visual hallucinations frequently throughout the night. She will attempt to lie down for a couple of minute and then will get up suddenly and quickly and begin yelling from her room. When staff enter room, she is yelling and swinging at someone that isn't present. Pt also came out to nurse's station on multiple occassions throughout the night, c/o being scared, as if there's a demon in her room - she has also swung at something/someone that isn't present. Pt didn't even want staff to go into her room for fear that staff would get hurt by the entity in her room. She also claimed to be able to transmit things through her hand. Pt stated her mother and sister were out in the lobby late at night - when pt was asked how she knew that, she opened the palm of her hand and began pointing at it, telling this writer there's writing on her hand that says, \"Your mother and sister are in the lobby. \" She has voiced many times throughout the night that she's frightened of something in her room. Staff have walked pt back into her room several times, turned lights on, and shown pt there is nothing in her room, in her bathroom or in her shower. One time during the night pt was convinced there was someone \"trying to get me\" behind the bathroom door. Staff have walked in on pt swinging at things, as if she's fighting someone. Pt came out screaming loudly, waking other patients up, and was unable to be redirected by staff. She was terrified and unable to be calmed down.     4415 - Received Haldol 2.5mg IM and Benadryl 25mg IM      Pt only slept 2 hrs of broken sleep throughout the night.

## 2020-07-22 NOTE — PROGRESS NOTES
Problem: Patient Education: Go to Patient Education Activity  Goal: Patient/Family Education  Outcome: Progressing Towards Goal     Problem: Depressed Mood (Adult/Pediatric)  Goal: *STG: Remains safe in hospital  Outcome: Progressing Towards Goal

## 2020-07-22 NOTE — BH NOTES
GROUP THERAPY PROGRESS NOTE    Patient did not participate in Substance Abuse Group.      Bev Stroud LPC LSATP CSAC

## 2020-07-22 NOTE — BH NOTES
[employer] conducted a virtual TDO Hearing this afternoon. The ending result of hearing, patient committed.

## 2020-07-22 NOTE — GROUP NOTE
ARNALDO  GROUP DOCUMENTATION INDIVIDUAL                                                                          Group Therapy Note    Date: 7/22/2020    Group Start Time: 1400  Group End Time: 1500  Group Topic: Recreational/Music Therapy    [facility] [place] 3 ACUTE BEHAV [facility]    [place], [address] [facility] GROUP DOCUMENTATION GROUP    Group Therapy Note    Attendees: 4         Attendance: Attended    Patient's Goal:  To concentrate on selected task    Interventions/techniques: Supported-crafts,games,music    Follows Directions:  Followed directions    Interactions: Interacted appropriately    Mental Status: Calm    Behavior/appearance: Cooperative    Goals Achieved: Able to engage in interactions and Able to listen to others      Additional Notes:  Arrived late-pleasant/polite    Arline Ruiz

## 2020-07-22 NOTE — GROUP NOTE
ARNALDO  GROUP DOCUMENTATION INDIVIDUAL                                                                          Group Therapy Note    Date: 7/22/2020    Group Start Time: 1000  Group End Time: 1100  Group Topic: Topic Group    [facility] [place] 3 ACUTE BEHAV TH    [place] [address] GROUP    Group Therapy Note    Attendees: 3         Attendance: Did not attend    Patient's Goal:      Interventions/techniques    [place] Allegra

## 2020-07-22 NOTE — BH NOTES
PSYCHOSOCIAL ASSESSMENT  :Patient identifying info:  Sandoval Ledezma is a 77 y.o., female admitted 2020  1:05 PM     Presenting problem and precipitating factors: PPatient was transferred to [facility] from [facility] ED under a TDO after Pt was brought to the ED by her sister for manic behavior. Pt's sister reports Pt has not been sleeping, talking nonstop, responding to internal stimuli, and experiencing AVH/delusions of monsters and demons. Pt reports she has been taking medications as prescribed and denies all symptoms, but is observed with behaviors indicative of these symptoms. Pt reports mental health treatment since the 1970s with 5 previous inpatient hospitalizations at both [facility] and [facility]. Pt denies SI/HI. Mental status assessment: Alert, oriented, agitated, cooperative    Strengths: Supportive family; engaged in treatment; stable housing    Collateral information: Sinan Abrams (sister [phone])    Current psychiatric /substance abuse providers and contact info: Dr. Quiroz Enter    Previous psychiatric/substance abuse providers and response to treatment: 5 previous inpatient psychiatric hospitalizations ([facility], [address] [facility])    Family history of mental illness or substance abuse: Unknown/none indicated    Substance abuse history:  None  Social History     Tobacco Use    Smoking status: Former Smoker     Last attempt to quit: 1988     Years since quittin.5    Smokeless tobacco: Never Used   Substance Use Topics    Alcohol use: Never     Frequency: Never       History of biomedical complications associated with substance abuse: Denies    Patient's current acceptance of treatment or motivation for change: Fair    Family constellation: Sister     Is significant other involved? No      Describe support system: Sister    Describe living arrangements and home environment: Patient lives with her sister.     Health issues:   Hospital Problems  Never Reviewed          Codes Class Noted POA    * (Principal) Bipolar disease, manic ([facility] ICD-10-CM: F31.10  ICD-9-CM: 296.40  2020 Unknown              Trauma history: None indicated    Legal issues: None indicated    History of  service: No    Financial status: SSDI    Mormon/cultural factors: Jainism    Education/work history: Unemployed on disability    Have you been licensed as a health care professional (current or ): No    Leisure and recreation preferences: Spending time with friends and family    Describe coping skills: Limited    Luwana Furnish  2020

## 2020-07-22 NOTE — PROGRESS NOTES
Laboratory monitoring for mood stabilizer and antipsychotics:    Recommended baseline monitoring has not been completed based on this patient's current medication regimen. The following lab will be ordered with patient's next lab draw to complete baseline monitoring: TSH     The patient is currently taking the following medication(s):   Current Facility-Administered Medications   Medication Dose Route Frequency    metoprolol succinate (TOPROL-XL) XL tablet 100 mg  100 mg Oral DAILY    lithium carbonate capsule 300 mg  300 mg Oral DAILY WITH DINNER    lithium carbonate capsule 150 mg  150 mg Oral DAILY WITH BREAKFAST    montelukast (SINGULAIR) tablet 10 mg  10 mg Oral QHS    insulin lispro (HUMALOG) injection   SubCUTAneous AC&HS    insulin NPH (NOVOLIN N, HUMULIN N) injection 20 Units  20 Units SubCUTAneous ACB&D    pantoprazole (PROTONIX) tablet 40 mg  40 mg Oral ACB    amLODIPine (NORVASC) tablet 5 mg  5 mg Oral DAILY    latanoprost (XALATAN) 0.005 % ophthalmic solution 1 Drop  1 Drop Both Eyes QPM         LITHIUM  Lab Results   Component Value Date/Time    Lithium level 0.44 (L) 07/21/2020 12:48 AM       Height, Weight, BMI Estimation  Estimated body mass index is 29.41 kg/m² as calculated from the following:    Height as of this encounter: 160 cm (63\"). Weight as of this encounter: 75.3 kg (166 lb). Renal Function, Hepatic Function and Chemistry  Estimated Creatinine Clearance: 34.5 mL/min (A) (based on SCr of 1.54 mg/dL (H)).     Lab Results   Component Value Date/Time    Sodium 141 07/21/2020 12:48 AM    Potassium 3.1 (L) 07/21/2020 12:48 AM    Chloride 110 (H) 07/21/2020 12:48 AM    CO2 24 07/21/2020 12:48 AM    Anion gap 7 07/21/2020 12:48 AM    Glucose 105 (H) 07/21/2020 12:48 AM    BUN 21 (H) 07/21/2020 12:48 AM    Creatinine 1.54 (H) 07/21/2020 12:48 AM    BUN/Creatinine ratio 14 07/21/2020 12:48 AM    GFR est AA 41 (L) 07/21/2020 12:48 AM    GFR est non-AA 34 (L) 07/21/2020 12:48 AM    Calcium 9.8 07/21/2020 12:48 AM    ALT (SGPT) 67 07/21/2020 12:48 AM    Alk. phosphatase 92 07/21/2020 12:48 AM    Protein, total 7.2 07/21/2020 12:48 AM    Albumin 3.6 07/21/2020 12:48 AM    Globulin 3.6 07/21/2020 12:48 AM    A-G Ratio 1.0 (L) 07/21/2020 12:48 AM    Bilirubin, total 0.4 07/21/2020 12:48 AM       Lab Results   Component Value Date/Time    Glucose 105 (H) 07/21/2020 12:48 AM    Glucose (POC) 235 (H) 07/22/2020 07:59 AM       Hematology  Lab Results   Component Value Date/Time    WBC 11.4 (H) 07/21/2020 12:48 AM    HGB 13.3 07/21/2020 12:48 AM    HCT 40.0 07/21/2020 12:48 AM    PLATELET 113 30/52/2519 12:48 AM    MCV 92.4 07/21/2020 12:48 AM       Visit Vitals  /65 (BP 1 Location: Right arm, BP Patient Position: Sitting)   Pulse 76   Temp 97.6 °F (36.4 °C)   Resp 18   Ht 160 cm (63\")   Wt 75.3 kg (166 lb)   SpO2 99%   BMI 29.41 kg/m²       Thank you,  Michelle Perez, Pharm. D.  [phone]

## 2020-07-22 NOTE — PROGRESS NOTES
Problem: Discharge Planning  Goal: *Knowledge of discharge instructions  Outcome: Progressing Towards Goal  Note: Patient verbalizes understanding of goals for treatment and safe discharge. Problem: Discharge Planning  Goal: *Knowledge of medication management  Outcome: Not Progressing Towards Goal  Note: Patient verbalizes understanding of medication regimen.   Patient is not willing to take medications as prescribed while in the hospital.

## 2020-07-22 NOTE — BH NOTES
Assumed care of patient from Lucia Yvette, [facility] this morning. Patient noted to be agitated with pressured speech this morning, denying SI/HI and A/V hallucinations. She tells this writer she has been seeing demons in her room and on the wall outside of her room. She reluctantly took her PO meds this morning and refused her Humalog, stating that at home she was only on Novolog 70/30. This writer held the Humalog and let Dr. Debbie Cline know. She has a flat affect, and is blunted. Denies pain at this time. Will continue Q 15 mins rounds. 1030- Patient at the nursing station yelling with pressured and rapid speech. Still believes there are demons in her room and specifically coming out of her toilet. Attempted to talk with patient regarding a dose of PRN zyprexa. She was agitated and told this writer she didn't know what that was for and that she wasn't going to take it. This writer explained that it would help her relax, patient refused PO    1115-Patient in room attempting to put a pillow in her toilet, stating the \"demons are living there and they are trying to come out and kill me. She was very agitated and was screaming at this writer for help to get the demons out of her room. 1130- spoke with Dr. Debbie Cline about switching Benadryl to Q6, instead of BID. New orders for Q 6 Benadryl. Patient medicated per [facility]     2510- patient Pia Cobb for lunch, calm and cooperative, allowing this writer to do Accucheck and give sliding scale insulin.

## 2020-07-22 NOTE — PROGRESS NOTES
Problem: Dominga/Hypomania  Goal: *LTG: Reduce agitation, impulsivity, and pressured speech  Description: Reduce agitation, impulsivity, and pressured speech while achieving sensitivity to the consequences of behavior and having more realistic expectations.   Outcome: Progressing Towards Goal

## 2020-07-22 NOTE — Clinical Note
Assumed care of patient this morning from [address]. Patient in room, agitated with pressured speech.   She denies SI and HI and also A/V

## 2020-07-22 NOTE — H&P
[facility] HISTORY AND PHYSICAL    Name:  Milton Judge  MR#:  [id number]  :  1955  ACCOUNT #:  [de-identified]  ADMIT DATE:  2020    PSYCHIATRIC INTAKE NOTE    CHIEF COMPLAINT:  \"My sister felt I was a little too loud, so I don't think I need to be here. \"    HISTORY OF PRESENT ILLNESS:  This is a 57-year-old [facility] American female with history of bipolar disorder and multiple medical conditions who comes to the hospital in a manic state per report and was aggressive towards her sister and difficult to redirect, elevated, and irritable. Police had to be called at home because of her behavior. She was reporting seeing demons at one point. However, during our interview, she states that she has been compliant with her meds at home until she came to the hospital.  Blood sugars are not under still control and she had a couple of low spells in the last week and needed to regulate that better. She acknowledged that it might needed more regulation. No aggression, no violence during our interview. Stated that she is not suicidal or homicidal, and she is okay at this time. She does present labile to the staff and she was seeing demons in her room in the walls and she wanted the walls to be ripped down, but our nurse talked her down and she has been calm since. She has been taking some of her medications now and she is here for management of her condition. PAST PSYCHIATRIC HISTORY:  Bipolar disorder, about five prior hospitalizations. She has been on disability since the 80s for bipolar disorder. PAST MEDICAL HISTORY:  Diabetes, hypertension, lymphoma, and no pertinent surgeries. ALLERGIES:  CODEINE, PENICILLINS, STATINS, AND SULFA DRUGS. SOCIAL HISTORY:  Lives with her sister, never , no children. On disability. Denies alcohol, drugs or cigarettes.     MENTAL STATUS EXAM:  Adult female, candid, calm and cooperative during the interview and speaks somewhat with a mumble, but relatively clear, coherent speech, variable rate from average to slightly rapid and average volume, also variable from appropriate to low in tone and within normal limits. No aggression or violence. Denies suicidal or homicidal ideations and no auditory or visual hallucinations. Aware of her surroundings, location, but no of situation because she is not certain why she should be here, not agreeing with her behavior being out of control. Here for management of her condition. DIAGNOSIS:  Bipolar giles. PLAN:  Admit for safety and stabilization, medication modification as needed, group therapy, individual therapy. Rule out mood disorder, exacerbated by medical condition. ESTIMATED LENGTH OF STAY:  5-7 days. DISPOSITION:  Planning with Social Work. STRENGTHS:  Willingness for treatment. DISABILITIES:  Medical condition. FABIENNE Casanova MD      [id number]  D:  07/22/2020 11:23  T:  07/22/2020 14:35  JOB #:  [id number]

## 2020-07-22 NOTE — BH NOTES
GROUP THERAPY PROGRESS NOTE    Patient did not participate in Discharge Group.      Jaime Coleman LPC [facility]

## 2020-07-22 NOTE — BH NOTES
Met with Cherelle for an individual session. She asked to walk in the collins with this writer and then asked to go to her room. She reports that she has lived in [address] her whole life and wishes there were more open spaces. She shared that she has struggled with concerns of demons and is currently upset with her family. She reports that she does not want to talk about them because it makes her mad. She denies any SI or HI. She was hard to understand when asked about hearing or seeing things. She was upset that the staff continue to check on her and that she feels like this room is too loud. When offered to try to move her to another room she stated she does not want to move because the other end of the collins is the \"bad end\". She reports wanting to get a shower and asked for towels. She was given towels and then asked this writer to come into the bathroom. She showed this writer that she had a BM. She was asked if she would like to speak to the  tomorrow and she was excited and stated yes. She also asked to speak to this writer again tomorrow. She was encouraged to come to group and also informed this writer would speak to her after group  Individually if time allowed. Patient very pleasant and cooperative. She thanked this writer for talking to her and asked to shake this writers hand.     Jennifer Grigsby LPC [facility]

## 2020-07-22 NOTE — INTERDISCIPLINARY ROUNDS
Behavioral Health Interdisciplinary Rounds     Patient Name: Susan Lamar    Age: 77 y.o. Room/Bed:  317/01  Primary Diagnosis: <principal problem not specified>     Admission Status: TDO     Readmission within 30 days: No  Power of  in place: No  Patient requires a blocked bed: Yes            Reason for blocked bed: COVID  Order for blocked bed obtained: N/A        Sleep hours: 2   Morning Labs completed per orders: Pt refused       Participation in Care/Groups: Unable to yet - extreme delusions  Medication Compliant?: Yes   PRNS (last 24 hours): Haldol 2.5mg IM and   Benadryl 25mg IM @ 0313  Restraints (last 24 hours): No  Substance Abuse: No    24 hour chart check complete: Yes    Patient goal(s) for today: Meet with treatment team; meet with TDO hearing team  Treatment team focus/goals: Complete psychosocial; MD to start medications  Progress note: Patient brought in under TDO for hallucinations and delusions of monsters.     LOS:  1  Expected LOS: TBD    Financial concerns/prescription coverage: [place] Medicare  Family contact: None  Family requesting physician contact today: No  Discharge plan: Return home  Access to weapons: No  Outpatient provider(s): Dr. Srinivas Krishnan  Patient's preferred phone number for follow up call: [phone]    Participating treatment team members: Jessica Paul MSW; Dr. Lissette Benton MD

## 2020-07-23 LAB
GLUCOSE BLD STRIP.AUTO-MCNC: 129 MG/DL (ref 65–100)
GLUCOSE BLD STRIP.AUTO-MCNC: 194 MG/DL (ref 65–100)
GLUCOSE BLD STRIP.AUTO-MCNC: 237 MG/DL (ref 65–100)
GLUCOSE BLD STRIP.AUTO-MCNC: 240 MG/DL (ref 65–100)
SERVICE CMNT-IMP: ABNORMAL

## 2020-07-23 PROCEDURE — 82962 GLUCOSE BLOOD TEST: CPT

## 2020-07-23 PROCEDURE — 74011636637 HC RX REV CODE- 636/637: Performed by: STUDENT IN AN ORGANIZED HEALTH CARE EDUCATION/TRAINING PROGRAM

## 2020-07-23 PROCEDURE — 74011250637 HC RX REV CODE- 250/637: Performed by: PSYCHIATRY & NEUROLOGY

## 2020-07-23 PROCEDURE — 74011250636 HC RX REV CODE- 250/636: Performed by: PSYCHIATRY & NEUROLOGY

## 2020-07-23 PROCEDURE — 65220000001 HC RM PRIVATE PSYCH

## 2020-07-23 PROCEDURE — 87635 SARS-COV-2 COVID-19 AMP PRB: CPT

## 2020-07-23 PROCEDURE — 74011250637 HC RX REV CODE- 250/637: Performed by: STUDENT IN AN ORGANIZED HEALTH CARE EDUCATION/TRAINING PROGRAM

## 2020-07-23 RX ORDER — OLANZAPINE 5 MG/1
5 TABLET ORAL 2 TIMES DAILY
Status: DISCONTINUED | OUTPATIENT
Start: 2020-07-23 | End: 2020-07-31 | Stop reason: HOSPADM

## 2020-07-23 RX ADMIN — INSULIN LISPRO 2 UNITS: 100 INJECTION, SOLUTION INTRAVENOUS; SUBCUTANEOUS at 11:56

## 2020-07-23 RX ADMIN — METOPROLOL SUCCINATE 100 MG: 50 TABLET, EXTENDED RELEASE ORAL at 08:31

## 2020-07-23 RX ADMIN — INSULIN LISPRO 3 UNITS: 100 INJECTION, SOLUTION INTRAVENOUS; SUBCUTANEOUS at 16:50

## 2020-07-23 RX ADMIN — LATANOPROST 1 DROP: 50 SOLUTION OPHTHALMIC at 17:20

## 2020-07-23 RX ADMIN — PANTOPRAZOLE SODIUM 40 MG: 40 TABLET, DELAYED RELEASE ORAL at 06:25

## 2020-07-23 RX ADMIN — HUMAN INSULIN 20 UNITS: 100 INJECTION, SUSPENSION SUBCUTANEOUS at 08:34

## 2020-07-23 RX ADMIN — MONTELUKAST SODIUM 10 MG: 10 TABLET, FILM COATED ORAL at 21:15

## 2020-07-23 RX ADMIN — HUMAN INSULIN 20 UNITS: 100 INJECTION, SUSPENSION SUBCUTANEOUS at 16:50

## 2020-07-23 RX ADMIN — DIPHENHYDRAMINE HYDROCHLORIDE 25 MG: 50 INJECTION, SOLUTION INTRAMUSCULAR; INTRAVENOUS at 02:46

## 2020-07-23 RX ADMIN — LITHIUM CARBONATE 300 MG: 300 CAPSULE, GELATIN COATED ORAL at 16:44

## 2020-07-23 RX ADMIN — AMLODIPINE BESYLATE 5 MG: 5 TABLET ORAL at 08:32

## 2020-07-23 RX ADMIN — OLANZAPINE 5 MG: 5 TABLET, FILM COATED ORAL at 16:44

## 2020-07-23 RX ADMIN — OLANZAPINE 2.5 MG: 2.5 TABLET, FILM COATED ORAL at 08:32

## 2020-07-23 RX ADMIN — INSULIN LISPRO 3 UNITS: 100 INJECTION, SOLUTION INTRAVENOUS; SUBCUTANEOUS at 21:15

## 2020-07-23 RX ADMIN — HALOPERIDOL LACTATE 2.5 MG: 5 INJECTION, SOLUTION INTRAMUSCULAR at 02:47

## 2020-07-23 RX ADMIN — LITHIUM CARBONATE 150 MG: 150 CAPSULE, GELATIN COATED ORAL at 08:32

## 2020-07-23 NOTE — PROGRESS NOTES
Problem: Depressed Mood (Adult/Pediatric)  Goal: *STG: Complies with medication therapy  Outcome: Progressing Towards Goal     Problem: Dominga/Hypomania  Goal: *STG: Be less agitated and distracted  Outcome: Progressing Towards Goal  Goal: *STG: Achieve mood stability  Description: Achieve mood stability, e.g. slower to react with anger and less expansive or elevated.   Outcome: Progressing Towards Goal

## 2020-07-23 NOTE — INTERDISCIPLINARY ROUNDS
[facility] Interdisciplinary Rounds     Patient Name: Jorge Luis Roman  Age: 77 y.o. Room/Bed:  317/01  Primary Diagnosis: Bipolar disease, manic ([address]   Admission Status: Involuntary Commitment     Readmission within 30 days: no  Power of  in place: unk    Patient requires a blocked bed: no            Reason for blocked bed: Pt is in a private room due to [employer] for blocked bed obtained: no       Sleep hours: 1 1/2 hrs   Morning Labs completed per orders:  na     Participation in Care/Groups:  no  Medication Compliant?: Yes  PRNS (last 24 hours):  Antipsychotic (IM) and Anticholinergic    Restraints (last 24 hours):  no  Substance Abuse:  no    24 hour chart check complete: yes   Patient goal(s) for today: Meet with treatment team; meet with TDO hearing team  Treatment team focus/goals: Complete psychosocial; MD to start medications  Progress note: Pt speaks softly - paranoid delusions of monsters and some realistic medication schedule concerns.      LOS:  2                        Expected LOS: TBD     Financial concerns/prescription coverage: [place] Medicare  Family contact: Ian Hurtado [phone] -  left 7/23  Family requesting physician contact today: Yes left  stating staff had told her she could speak directly with MD- 7/23 - MD to be notified on 7/24  Discharge plan: Return home  Access to weapons: No  Outpatient provider(s): Dr. Barbara Ely  Patient's preferred phone number for follow up call: [phone]     Participating treatment team members: Jazmin Fritz MSW; Dr. Adrien Presley MD

## 2020-07-23 NOTE — GROUP NOTE
ARNALDO  GROUP DOCUMENTATION INDIVIDUAL                                                                          Group Therapy Note    Date: 7/23/2020    Group Start Time: 1400  Group End Time: 1500  Group Topic: Recreational/Music Therapy    [facility] 3 ACUTE BEHAV [facility]    [place], [facility] GROUP DOCUMENTATION GROUP    Group Therapy Note    Attendees: 4         Attendance: Attended    Patient's Goal:  To concentrate on selected task    Interventions/techniques: Supported-crafts,games,music    Follows Directions:  Followed directions    Interactions: Interacted appropriately    Mental Status: Calm    Behavior/appearance: Attentive and Cooperative-required prompting    Goals Achieved: Able to engage in interactions and Able to listen to others      Additional Notes:  Elected to listen to [id number] entire session    Evonne Vazquez

## 2020-07-23 NOTE — PROGRESS NOTES
Spiritual Care Assessment/Progress Note  Nanci Patel      NAME: Sandoval Ledezma      MRN: [id number]  AGE: 77 y.o. SEX: female  Hinduism Affiliation: Other   Language: English     7/23/2020     Total Time (in minutes): 26     Spiritual Assessment begun in [place] [address] [address] [place] [address] through conversation with:         [x]Patient        [] Family    [] Friend(s)        Reason for Consult: Initial/Spiritual assessment, patient floor     Spiritual beliefs: (Please include comment if needed)     [] Identifies with a kelin tradition:         [] Supported by a kelin community:            [] Claims no spiritual orientation:           [] Seeking spiritual identity:                [] Adheres to an individual form of spirituality:           [x] Not able to assess:                           Identified resources for coping:      [] Prayer                               [] Music                  [] Guided Imagery     [] Family/friends                 [] Pet visits     [] Devotional reading                         [x] Unknown     [] Other:                                             Interventions offered during this visit: (See comments for more details)    Patient Interventions: Affirmation of emotions/emotional suffering           Plan of Care:     [] Support spiritual and/or cultural needs    [] Support AMD and/or advance care planning process      [] Support grieving process   [] Coordinate Rites and/or Rituals    [] Coordination with community clergy   [x] No spiritual needs identified at this time   [] Detailed Plan of Care below (See Comments)  [] Make referral to Music Therapy  [] Make referral to Pet Therapy     [] Make referral to Addiction services  [] Make referral to [facility]  [] Make referral to Spiritual Care Partner  [] No future visits requested        [] Follow up visits as needed     Comments:   Patient was visited on the Acute in [facility] unit to make a spiritual assessment.  The patient did not have visitors or staff present. The patient escorted me to her room. The patient assisted me when I indicated that I wanted the door open by suggesting that we use a towel in the hinge of the door. The patient stated that she had recently moved to her current room. The patient explained that she had to wash her hands. I showed her the sink and she washed her hands. The patient sat on her bed and I sat in a chair from the day room. The patient began talking. I heard her say that her sister put her here, what her name is, who her psychiatrist at [facility] is, that she knows everything abut the [address], and more. The patient stated something about a musician. I waited my turn to talk and interrupted her gently, she then gave me the name of her brother who is a tenor, and sings in operas. The patient had much information to share. . Attempts to end the visit were made unsuccessfully. The patient agreed to end the conversation when I indicated that I will return and visit with her again. Provided a ministry of presence and active listening. Advised patient of  availability. Chaplains will follow as able and/or needed. Rev.  Osiel Washington, Dale  [address] [facility] [phone]

## 2020-07-23 NOTE — BH NOTES
Psychiatric Progress Note    Patient: Augustine Davis MRN: [id number]  SSN: [id number]    YOB: 1955  Age: 77 y.o. Sex: female      Admit Date: 7/21/2020    LOS: 2 days     Subjective:     Augustine Davis  reports feeling alright, but presents to staff as paranoid seeing snakes in her room. Moods are labile. Upset with her passing friends and family. Does not feel she is heard. Denies SI/HI/AH/VH. No aggression or violence. Responds to internal stimuli and beat up her pillows. Interactive with minimal awareness. Tolerating medications well when taken. Eating well and sleeping well.     Objective:     Vitals:    07/22/20 0808 07/22/20 2101 07/23/20 0812 07/23/20 0831   BP: 131/65 127/68 (!) 82/70 139/64   Pulse: 76 73 95 87   Resp: 18 16 18    Temp: 97.6 °F (36.4 °C) 97.6 °F (36.4 °C) 97.6 °F (36.4 °C)    SpO2: 99% 100% 100%    Weight:       Height:            Mental Status Exam:   Sensorium  confused   Relations cooperative   Eye Contact appropriate   Appearance:  older than stated age   Speech:  hyperverbal, non-pressured and garbled   Thought Process: disorganized   Thought Content delusions and preoccupations   Suicidal ideations none   Mood:  labile   Affect:  labile   Memory   impaired   Concentration:  impaired   Insight:  poor   Judgment:  impaired due to Condition       MEDICATIONS:  Current Facility-Administered Medications   Medication Dose Route Frequency    OLANZapine (ZyPREXA) tablet 5 mg  5 mg Oral BID    fluticasone propionate (FLONASE) 50 mcg/actuation nasal spray 2 Spray  2 Spray Both Nostrils DAILY PRN    diphenhydrAMINE (BENADRYL) injection 25 mg  25 mg IntraMUSCular Q6H PRN    OLANZapine (ZyPREXA) tablet 2.5 mg  2.5 mg Oral Q6H PRN    haloperidol lactate (HALDOL) injection 2.5 mg  2.5 mg IntraMUSCular Q6H PRN    benztropine (COGENTIN) tablet 0.5 mg  0.5 mg Oral BID PRN    magnesium hydroxide (MILK OF MAGNESIA) 400 mg/5 mL oral suspension 30 mL  30 mL Oral DAILY PRN    metoprolol succinate (TOPROL-XL) XL tablet 100 mg  100 mg Oral DAILY    lithium carbonate capsule 300 mg  300 mg Oral DAILY WITH DINNER    lithium carbonate capsule 150 mg  150 mg Oral DAILY WITH BREAKFAST    montelukast (SINGULAIR) tablet 10 mg  10 mg Oral QHS    glucose chewable tablet 16 g  4 Tab Oral PRN    glucagon (GLUCAGEN) injection 1 mg  1 mg IntraMUSCular PRN    dextrose 10% infusion 0-250 mL  0-250 mL IntraVENous PRN    insulin lispro (HUMALOG) injection   SubCUTAneous AC&HS    insulin NPH (NOVOLIN N, HUMULIN N) injection 20 Units  20 Units SubCUTAneous ACB&D    pantoprazole (PROTONIX) tablet 40 mg  40 mg Oral ACB    amLODIPine (NORVASC) tablet 5 mg  5 mg Oral DAILY    latanoprost (XALATAN) 0.005 % ophthalmic solution 1 Drop  1 Drop Both Eyes QPM      DISCUSSION:   the risks and benefits of the proposed medication  patient given opportunity to ask questions    Lab/Data Review: All lab results for the last 24 hours reviewed.      Recent Results (from the past 24 hour(s))   GLUCOSE, POC    Collection Time: 07/22/20 11:53 PM   Result Value Ref Range    Glucose (POC) 225 (H) 65 - 100 mg/dL    Performed by Arnold Harris    GLUCOSE, POC    Collection Time: 07/23/20  7:52 AM   Result Value Ref Range    Glucose (POC) 129 (H) 65 - 100 mg/dL    Performed by Gokul Gallagher (SINDY)    GLUCOSE, POC    Collection Time: 07/23/20 11:51 AM   Result Value Ref Range    Glucose (POC) 194 (H) 65 - 100 mg/dL    Performed by Gokul ASHTON)    GLUCOSE, POC    Collection Time: 07/23/20  4:23 PM   Result Value Ref Range    Glucose (POC) 240 (H) 65 - 100 mg/dL    Performed by Thao Turner    SARS-COV-2    Collection Time: 07/23/20  5:52 PM   Result Value Ref Range    Specimen source Nasopharyngeal      SARS-CoV-2 PENDING     SARS-CoV-2 PENDING     Specimen source Nasopharyngeal      COVID-19 rapid test PENDING     COVID-19 PENDING NEG   GLUCOSE, POC    Collection Time: 07/23/20  9:00 PM   Result Value Ref Range    Glucose (POC) 237 (H) 65 - 100 mg/dL    Performed by Amelia Haque RN          Assessment:     Principal Problem:    Bipolar disease, manic ([address] (7/21/2020)        Plan:     Continue current care  Collateral information  Disposition planning with social work    Signed By: Alyssa Chaudhary MD     July 23, 2020

## 2020-07-23 NOTE — BH NOTES
GROUP THERAPY PROGRESS NOTE     Patient is participating in coping skills group. Group time: 45 minutes     Personal goal for participation: Discuss ways to better self-talk. Goal orientation: Personal     Group therapy participation: active     Therapeutic interventions reviewed and discussed: Group discussion on ways patients can be their own best friend. Discussion involved internal voice/self-talk and the ways that this talk can be negative or positive. The group discussed ways to change the self-talk to a more positive experience to help themselves and ways they could involve others to help change this talk. Impression of participation: Kala Lawrence was in group but did not want to participate in the planned activity. She was the only one on the day room so she was allowed to discuss other concerns with this writer. She shared her concern for her medication and had this writer write down with her and discuss her medication and glucose testing times and number of units. She shared how if she eats a balanced meal she does not need to use as much. She was getting herself worked up and said [employer] and sat quietly with this writer in the dayroom while she calmed down. She then became paranoid when another patient walked up and she closed her book and got up and left the dayroom.      Bev Stroud LPC [place]

## 2020-07-23 NOTE — PROGRESS NOTES
Problem: Dominga/Hypomania  Goal: *LTG: Reduce psychic energy and return to normal activity levels, good judgement, stable mood, and goal-directed behavior  Outcome: Not Progressing Towards Goal  Goal: *LTG: Reduce agitation, impulsivity, and pressured speech  Description: Reduce agitation, impulsivity, and pressured speech while achieving sensitivity to the consequences of behavior and having more realistic expectations. Outcome: Not Progressing Towards Goal  Goal: *LTG: Talk about underlying feelings of low self-esteem or guilt and fears of rejection, dependency, and abandonment  Outcome: Not Progressing Towards Goal  Goal: *LTG: Achieve controlled behavior, moderated  mood, and more deliberative speech and thought processes  Description: Achieve controlled behavior, moderated  mood, and more deliberative speech and thought processes through psychotherapy and medication. Outcome: Not Progressing Towards Goal  Goal: *STG: Sleep about 5 hours or more per night  Outcome: Not Progressing Towards Goal  Goal: *STG: Be less agitated and distracted  Description: Be less agitated and distracted, e.g. be able to sit quietly and calmly for minutes  Outcome: Not Progressing Towards Goal  Goal: *STG: Decrease impulsivity in action  Description: Decrease impulsivity in action, e.g. refrain from engaging in self-destructive behaviors such as over-spending, promiscuity, substance abuse, or use of profane language.   Outcome: Not Progressing Towards Goal  Goal: *STG: Identify fears associated with rejection, failure and abandonment  Outcome: Not Progressing Towards Goal  Goal: *STG: Acknowledge and explore causes for low self-esteem that is covered by grandiosity  Outcome: Not Progressing Towards Goal  Goal: *STG: Speak more slowly and be more subject-focused  Outcome: Not Progressing Towards Goal  Goal: *STG: Achieve mood stability  Description: Achieve mood stability, e.g. slower to react with anger and less expansive or elevated. Outcome: Not Progressing Towards Goal  Goal: *STG: Pay attention to dressing and grooming appropriately  Outcome: Not Progressing Towards Goal  Goal: *STG: Comply with psychotropic medications as prescribed  Outcome: Progressing Towards Goal  Goal: Dominga/Hypomania Interventions  Outcome: Progressing Towards Goal      0718 This writer received report from the outgoing nurse. 6524 Patient in hallway yelling and screaming. Patient hitting the nursing station Patient responding to internal stimuli. Patient with guarded attitude, labile affect, and labile mood. Patient with intrusive thoughts and looseness of association. Patient denied SI, HI, and AVH. This writer noted the patient to be having AVH.     5824 Patient received prn 2.5 mg zyprexa for psychosis. Patient is diabetic. Patient received 20 units novolin, humulin before breakfast.     0915 Patient on the floor in hallway responding to internal stimuli. Patient complied with directives to get up off of the floor. 4091 Patient intrusive at the nursing station. Patient failed to comply with directives to leave the nursing station. 1151 Patient's blood sugar is 194; 2 units of insulin given. 1333 Patient in room resting quietly. 1350 Patient walked into the nursing station. Patient complied with directives to leave the nursing station. Patient responding to internal stimuli. 1623 Patient's blood sugar is 240. Patient received 3 units of lispro. 1653 Patient in dayroom eating dinner. Patient has been med and meal compliant throughout the day.      1800 Patient completed covid-19 test.

## 2020-07-23 NOTE — GROUP NOTE
ARNALDO  GROUP DOCUMENTATION INDIVIDUAL                                                                          Group Therapy Note    Date: 7/23/2020    Group Start Time: 1000  Group End Time: 1100  Group Topic: Topic Group    [facility] [address] ACUTE [address] [facility], [address] [facility] GROUP DOCUMENTATION GROUP    Group Therapy Note    Attendees: 5         Attendance: Attended    Patient's Goal:  To participate in anger management [employer] game    Interventions/techniques: Supported-things pertaining to anger    Follows Directions:  Followed directions    Interactions: Interacted appropriately    Mental Status: Calm    Behavior/appearance: Needed prompting    Goals Achieved: able to listen to others      Additional Notes:  Pt was present for most of session-elected to listen-quiet/attentive    Venancio Ngueyn

## 2020-07-23 NOTE — BH NOTES
Assumed nursing care of Cherelle after receiving the 1900 change of shift report. Luis is a female with short receding hairline and a good bit of facial hair that is growing. She presented with labile affect and mood. At times she would speak softly and almost at a whisper, at other times she presented with a loud, angry tone of voice. She also has poor insight and denied all problems including SI, HI and all s/s of psychosis such as AV hallucinations. However, in her room alone she responds loudly to internal stimuli while making threatening, angry sounds and at times she would twist a towel would swipe it in the air as if beating on someone not visible. Other times she would be beating up the bed with this twisted towel. Monitoring closely for mood chgs, behavior and for safety. Addendum:  Pt received Benadryl 25mg IM to right arm at 0246 and Haldol 2.5 mg IM at 0247 for psychosis. Pt has spent hours growling, making unusual noises and using 4 pillows to hit at everything in her room including the wall, door, bed as if fighting with something or someone. Pt walked some in the collins, then was scared to go back into her room, she was changed into a new room per charge nurse. Addendum:  Pt slept 1 1/2 hrs. The latter part of the shift was spent in the hallway, pt stated there was a snake in her room. AddendumCatpatrick Rodrigez stated her psychiatrist is Dr. Dejan Gamino at [facility].   [phone]

## 2020-07-24 LAB
GLUCOSE BLD STRIP.AUTO-MCNC: 115 MG/DL (ref 65–100)
GLUCOSE BLD STRIP.AUTO-MCNC: 140 MG/DL (ref 65–100)
GLUCOSE BLD STRIP.AUTO-MCNC: 156 MG/DL (ref 65–100)
GLUCOSE BLD STRIP.AUTO-MCNC: 66 MG/DL (ref 65–100)
GLUCOSE BLD STRIP.AUTO-MCNC: 92 MG/DL (ref 65–100)
GLUCOSE BLD STRIP.AUTO-MCNC: 96 MG/DL (ref 65–100)
SARS-COV-2, COV2: NOT DETECTED
SERVICE CMNT-IMP: ABNORMAL
SERVICE CMNT-IMP: NORMAL
SOURCE, COVRS: NORMAL
SPECIMEN SOURCE, FCOV2M: NORMAL

## 2020-07-24 PROCEDURE — 82962 GLUCOSE BLOOD TEST: CPT

## 2020-07-24 PROCEDURE — 74011250637 HC RX REV CODE- 250/637: Performed by: PSYCHIATRY & NEUROLOGY

## 2020-07-24 PROCEDURE — 74011636637 HC RX REV CODE- 636/637: Performed by: STUDENT IN AN ORGANIZED HEALTH CARE EDUCATION/TRAINING PROGRAM

## 2020-07-24 PROCEDURE — 74011250637 HC RX REV CODE- 250/637: Performed by: STUDENT IN AN ORGANIZED HEALTH CARE EDUCATION/TRAINING PROGRAM

## 2020-07-24 PROCEDURE — 65220000001 HC RM PRIVATE PSYCH

## 2020-07-24 RX ADMIN — HUMAN INSULIN 20 UNITS: 100 INJECTION, SUSPENSION SUBCUTANEOUS at 16:37

## 2020-07-24 RX ADMIN — LITHIUM CARBONATE 150 MG: 150 CAPSULE, GELATIN COATED ORAL at 07:59

## 2020-07-24 RX ADMIN — LATANOPROST 1 DROP: 50 SOLUTION OPHTHALMIC at 17:19

## 2020-07-24 RX ADMIN — MONTELUKAST SODIUM 10 MG: 10 TABLET, FILM COATED ORAL at 22:10

## 2020-07-24 RX ADMIN — OLANZAPINE 5 MG: 5 TABLET, FILM COATED ORAL at 16:35

## 2020-07-24 RX ADMIN — INSULIN LISPRO 2 UNITS: 100 INJECTION, SOLUTION INTRAVENOUS; SUBCUTANEOUS at 10:54

## 2020-07-24 RX ADMIN — OLANZAPINE 5 MG: 5 TABLET, FILM COATED ORAL at 08:00

## 2020-07-24 RX ADMIN — METOPROLOL SUCCINATE 100 MG: 50 TABLET, EXTENDED RELEASE ORAL at 07:59

## 2020-07-24 RX ADMIN — PANTOPRAZOLE SODIUM 40 MG: 40 TABLET, DELAYED RELEASE ORAL at 06:33

## 2020-07-24 RX ADMIN — INSULIN LISPRO 2 UNITS: 100 INJECTION, SOLUTION INTRAVENOUS; SUBCUTANEOUS at 16:36

## 2020-07-24 RX ADMIN — OLANZAPINE 2.5 MG: 2.5 TABLET, FILM COATED ORAL at 00:26

## 2020-07-24 RX ADMIN — AMLODIPINE BESYLATE 5 MG: 5 TABLET ORAL at 08:00

## 2020-07-24 RX ADMIN — LITHIUM CARBONATE 300 MG: 300 CAPSULE, GELATIN COATED ORAL at 16:35

## 2020-07-24 NOTE — BH NOTES
Behavioral Health Interdisciplinary Rounds     Patient Name: Monalisa Mcardle  Age: 77 y.o. Room/Bed:  307/01  Primary Diagnosis: Bipolar disease, manic ([facility] [address]   Admission Status: CI     Readmission within 30 days: No  Power of  in place: Unknown  Patient requires a blocked bed: Yes            Reason for blocked bed: COVID  Order for blocked bed obtained: N/A  Sleep hours: 1     Morning Labs completed per orders:        Participation in Care/Groups:  Yes  Medication Compliant?: Yes  PRNS (last 24 hours): Zyprexa 2.5mg PO    Restraints (last 24 hours): No  Substance Abuse:  No    24 hour chart check complete: Yes     Patient goal(s) for today: Take medications as prescribed.    Treatment team focus/goals:  Stabilize on medication   Progress note: Pt's mood is labile and thought process remains illogical with paranoid delusions.      LOS:  3                       Expected LOS: TBD     Financial concerns/prescription coverage: [employer] Medicare  Family contact: Theodore Elliott [phone] - VM left 7/23  Family requesting physician contact today: No  Discharge plan: Return home  Access to weapons: No  Outpatient provider(s): Dr. Maricruz Villa  Patient's preferred phone number for follow up [phone]     Participating treatment team members: PEPE Pryor; Dr. Gonzalo Alexandra MD

## 2020-07-24 NOTE — PROGRESS NOTES
Problem: Falls - Risk of  Goal: *Absence of Falls  Description: Document Bere Mcgee Fall Risk and appropriate interventions in the flowsheet.   7/24/2020 0921 by Slick Vargas RN  Outcome: Progressing Towards Goal  Note: Fall Risk Interventions:            Medication Interventions: Teach patient to arise slowly                7/24/2020 0920 by Slick Vargas RN  Outcome: Progressing Towards Goal  Note: Fall Risk Interventions:            Medication Interventions: Teach patient to arise slowly

## 2020-07-24 NOTE — BH NOTES
Psychiatric Progress Note    Patient: Maria G Camargo MRN: [id number]  SSN: [id number]    YOB: 1955  Age: 77 y.o. Sex: female      Admit Date: 7/21/2020    LOS: 3 days     Subjective:     Maria G Camargo  reports feeling alright, but presents to staff as paranoid seeing snakes in her room. Moods are labile. Upset with her passing friends and family. Does not feel she is heard. Denies SI/HI/AH/VH. No aggression or violence. Responds to internal stimuli and beat up her pillows. Interactive with minimal awareness. Tolerating medications well when taken. Eating well and sleeping well. 7/24 - Maria G Camargo continues to present as labile to staff, but today was direct in her responses to this interviewer. Garbled speech but appropriate nods to questions. Reports feeling well and moods are good. Denies SI/HI/AH/VH. No aggression or violence. Appropriately interactive and aware. Tolerating medications well. Eating and sleeping fairly. Notes having a conflict with her older sister but is ready to leave soon.        Objective:     Vitals:    07/23/20 0812 07/23/20 0831 07/23/20 1957 07/24/20 0800   BP: (!) 82/70 139/64 127/70 122/76   Pulse: 95 87 74 75   Resp: 18  18 16   Temp: 97.6 °F (36.4 °C)   98.1 °F (36.7 °C)   SpO2: 100%  100% 100%   Weight:       Height:            Mental Status Exam:   Sensorium  confused   Relations cooperative   Eye Contact appropriate   Appearance:  older than stated age   Speech:  hyperverbal, non-pressured and garbled   Thought Process: disorganized   Thought Content delusions and preoccupations   Suicidal ideations none   Mood:  labile   Affect:  labile   Memory   impaired   Concentration:  impaired   Insight:  poor   Judgment:  impaired due to Condition       MEDICATIONS:  Current Facility-Administered Medications   Medication Dose Route Frequency    OLANZapine (ZyPREXA) tablet 5 mg  5 mg Oral BID    fluticasone propionate (FLONASE) 50 mcg/actuation nasal spray 2 Spray  2 Spray Both Nostrils DAILY PRN    diphenhydrAMINE (BENADRYL) injection 25 mg  25 mg IntraMUSCular Q6H PRN    OLANZapine (ZyPREXA) tablet 2.5 mg  2.5 mg Oral Q6H PRN    haloperidol lactate (HALDOL) injection 2.5 mg  2.5 mg IntraMUSCular Q6H PRN    benztropine (COGENTIN) tablet 0.5 mg  0.5 mg Oral BID PRN    magnesium hydroxide (MILK OF MAGNESIA) 400 mg/5 mL oral suspension 30 mL  30 mL Oral DAILY PRN    metoprolol succinate (TOPROL-XL) XL tablet 100 mg  100 mg Oral DAILY    lithium carbonate capsule 300 mg  300 mg Oral DAILY WITH DINNER    lithium carbonate capsule 150 mg  150 mg Oral DAILY WITH BREAKFAST    montelukast (SINGULAIR) tablet 10 mg  10 mg Oral QHS    glucose chewable tablet 16 g  4 Tab Oral PRN    glucagon (GLUCAGEN) injection 1 mg  1 mg IntraMUSCular PRN    dextrose 10% infusion 0-250 mL  0-250 mL IntraVENous PRN    insulin lispro (HUMALOG) injection   SubCUTAneous AC&HS    insulin NPH (NOVOLIN N, HUMULIN N) injection 20 Units  20 Units SubCUTAneous ACB&D    pantoprazole (PROTONIX) tablet 40 mg  40 mg Oral ACB    amLODIPine (NORVASC) tablet 5 mg  5 mg Oral DAILY    latanoprost (XALATAN) 0.005 % ophthalmic solution 1 Drop  1 Drop Both Eyes QPM      DISCUSSION:   the risks and benefits of the proposed medication  patient given opportunity to ask questions    Lab/Data Review: All lab results for the last 24 hours reviewed.      Recent Results (from the past 24 hour(s))   GLUCOSE, POC    Collection Time: 07/23/20  4:23 PM   Result Value Ref Range    Glucose (POC) 240 (H) 65 - 100 mg/dL    Performed by Daria Lainez    SARS-COV-2    Collection Time: 07/23/20  5:52 PM   Result Value Ref Range    Specimen source Nasopharyngeal      SARS-CoV-2 PENDING     Specimen source Nasopharyngeal     GLUCOSE, POC    Collection Time: 07/23/20  9:00 PM   Result Value Ref Range    Glucose (POC) 237 (H) 65 - 100 mg/dL    Performed by Maya Segovia RN    GLUCOSE, POC    Collection Time: 07/24/20  7:53 AM   Result Value Ref Range    Glucose (POC) 66 65 - 100 mg/dL    Performed by Lisa Caicedo, POC    Collection Time: 07/24/20  8:23 AM   Result Value Ref Range    Glucose (POC) 115 (H) 65 - 100 mg/dL    Performed by Lisa Caicedo, POC    Collection Time: 07/24/20 10:33 AM   Result Value Ref Range    Glucose (POC) 140 (H) 65 - 100 mg/dL    Performed by Balaji Thakur          Assessment:     Principal Problem:    Bipolar disease, manic ([facility] [address] (7/21/2020)        Plan:     Continue current care  Collateral information  Disposition planning with social work    Signed By: Felisha Odell MD     July 24, 2020

## 2020-07-24 NOTE — GROUP NOTE
ARNALDO  GROUP DOCUMENTATION INDIVIDUAL                                                                          Group Therapy Note    Date: 7/24/2020    Group Start Time: 1500  Group End Time: 1600  Group Topic: Recreational/Music Therapy    [facility] [place] [address] ACUTE BEHAV [facility]    [place], [address] [facility] GROUP DOCUMENTATION GROUP    Group Therapy Note    Attendees: 7         Attendance: Attended    Patient's Goal:  To concentrate on selected task    Interventions/techniques: Supported-crafts,games,music    Follows Directions:  Followed directions    Interactions: Interacted appropriately    Mental Status: Calm    Behavior/appearance: Attentive, Cooperative and Needed prompting    Goals Achieved: Able to engage in interactions and Able to listen to others      Additional Notes:  Pleasant-completed task    Francisco Wang

## 2020-07-24 NOTE — GROUP NOTE
ARNALDO  GROUP DOCUMENTATION INDIVIDUAL                                                                          Group Therapy Note    Date: 7/24/2020    Group Start Time: 1100  Group End Time: 1200  Group Topic: Topic Group    [facility] [place] [facility] ACUTE BEHAV [facility]    [address] [facility] GROUP DOCUMENTATION GROUP    Group Therapy Note    Attendees: 5         Attendance: Attended    Patient's Goal:  To participate in self esteem booster    Interventions/techniques: Supported-handout People With Mental Illness Enrich Our Lives    Follows Directions:  Followed directions    Interactions: Interacted appropriately    Mental Status: Calm    Behavior/appearance: Attentive, Cooperative and Needed prompting    Goals Achieved: Able to engage in interactions, Able to listen to others, Discussed coping and Discussed self-esteem issues      Additional Notes:  Pleasant-stayed entire session    Karla Caro

## 2020-07-24 NOTE — BH NOTES
Report received from off going nurse, assumed care of resident. She is very irritable but cooperates with medication admin and meals. She is alert and oriented x 3. Was cooperative with blood glucose check. BG 66. Pt then refused am insulin. She denies SI/HI, AVH, depression, anxiety, and pain. Will continue to monitor for changes.

## 2020-07-24 NOTE — BH NOTES
Assumed care for the patient following day shift at [address] [place] [address]. Patient presents as irritable but cooperative. Patient was compliant with their bedtime medication during this shift but woke up from an alarm on the unit and began yelling. Patient stated \" there are people in my room that are out to get me. \" Patient became paranoid and calmed down once they were in the hallway with staff. Patient did receive Zyprexa 2.5mg PO. Medication was effective and patient was able to return to their room to get some more rest. Patient denies any S/I and H/I. Patient endorses auditory and visual hallucinations.  Patient slept a total of 1 hour

## 2020-07-25 LAB
BASOPHILS # BLD: 0.1 K/UL (ref 0–0.1)
BASOPHILS NFR BLD: 1 % (ref 0–1)
DIFFERENTIAL METHOD BLD: NORMAL
EOSINOPHIL # BLD: 0.3 K/UL (ref 0–0.4)
EOSINOPHIL NFR BLD: 3 % (ref 0–7)
ERYTHROCYTE [DISTWIDTH] IN BLOOD BY AUTOMATED COUNT: 13.2 % (ref 11.5–14.5)
GLUCOSE BLD STRIP.AUTO-MCNC: 103 MG/DL (ref 65–100)
GLUCOSE BLD STRIP.AUTO-MCNC: 109 MG/DL (ref 65–100)
GLUCOSE BLD STRIP.AUTO-MCNC: 136 MG/DL (ref 65–100)
GLUCOSE BLD STRIP.AUTO-MCNC: 147 MG/DL (ref 65–100)
GLUCOSE BLD STRIP.AUTO-MCNC: 56 MG/DL (ref 65–100)
GLUCOSE BLD STRIP.AUTO-MCNC: 57 MG/DL (ref 65–100)
GLUCOSE BLD STRIP.AUTO-MCNC: 69 MG/DL (ref 65–100)
HCT VFR BLD AUTO: 39.5 % (ref 35–47)
HGB BLD-MCNC: 13.3 G/DL (ref 11.5–16)
IMM GRANULOCYTES # BLD AUTO: 0 K/UL (ref 0–0.04)
IMM GRANULOCYTES NFR BLD AUTO: 0 % (ref 0–0.5)
LYMPHOCYTES # BLD: 2 K/UL (ref 0.8–3.5)
LYMPHOCYTES NFR BLD: 22 % (ref 12–49)
MAGNESIUM SERPL-MCNC: 1.9 MG/DL (ref 1.6–2.4)
MCH RBC QN AUTO: 31.4 PG (ref 26–34)
MCHC RBC AUTO-ENTMCNC: 33.7 G/DL (ref 30–36.5)
MCV RBC AUTO: 93.2 FL (ref 80–99)
MONOCYTES # BLD: 0.6 K/UL (ref 0–1)
MONOCYTES NFR BLD: 7 % (ref 5–13)
NEUTS SEG # BLD: 5.9 K/UL (ref 1.8–8)
NEUTS SEG NFR BLD: 67 % (ref 32–75)
NRBC # BLD: 0 K/UL (ref 0–0.01)
NRBC BLD-RTO: 0 PER 100 WBC
PHOSPHATE SERPL-MCNC: 3 MG/DL (ref 2.6–4.7)
PLATELET # BLD AUTO: 292 K/UL (ref 150–400)
PMV BLD AUTO: 9.4 FL (ref 8.9–12.9)
RBC # BLD AUTO: 4.24 M/UL (ref 3.8–5.2)
SERVICE CMNT-IMP: ABNORMAL
SERVICE CMNT-IMP: NORMAL
WBC # BLD AUTO: 8.8 K/UL (ref 3.6–11)

## 2020-07-25 PROCEDURE — 74011636637 HC RX REV CODE- 636/637: Performed by: STUDENT IN AN ORGANIZED HEALTH CARE EDUCATION/TRAINING PROGRAM

## 2020-07-25 PROCEDURE — 85025 COMPLETE CBC W/AUTO DIFF WBC: CPT

## 2020-07-25 PROCEDURE — 36415 COLL VENOUS BLD VENIPUNCTURE: CPT

## 2020-07-25 PROCEDURE — 65220000001 HC RM PRIVATE PSYCH

## 2020-07-25 PROCEDURE — 82962 GLUCOSE BLOOD TEST: CPT

## 2020-07-25 PROCEDURE — 83735 ASSAY OF MAGNESIUM: CPT

## 2020-07-25 PROCEDURE — 84100 ASSAY OF PHOSPHORUS: CPT

## 2020-07-25 PROCEDURE — 74011250637 HC RX REV CODE- 250/637: Performed by: PSYCHIATRY & NEUROLOGY

## 2020-07-25 PROCEDURE — 74011250637 HC RX REV CODE- 250/637: Performed by: STUDENT IN AN ORGANIZED HEALTH CARE EDUCATION/TRAINING PROGRAM

## 2020-07-25 RX ADMIN — OLANZAPINE 2.5 MG: 2.5 TABLET, FILM COATED ORAL at 04:13

## 2020-07-25 RX ADMIN — METOPROLOL SUCCINATE 100 MG: 50 TABLET, EXTENDED RELEASE ORAL at 07:54

## 2020-07-25 RX ADMIN — INSULIN LISPRO 2 UNITS: 100 INJECTION, SOLUTION INTRAVENOUS; SUBCUTANEOUS at 07:53

## 2020-07-25 RX ADMIN — OLANZAPINE 5 MG: 5 TABLET, FILM COATED ORAL at 07:54

## 2020-07-25 RX ADMIN — OLANZAPINE 5 MG: 5 TABLET, FILM COATED ORAL at 16:56

## 2020-07-25 RX ADMIN — LITHIUM CARBONATE 300 MG: 300 CAPSULE, GELATIN COATED ORAL at 16:56

## 2020-07-25 RX ADMIN — LATANOPROST 1 DROP: 50 SOLUTION OPHTHALMIC at 17:00

## 2020-07-25 RX ADMIN — PANTOPRAZOLE SODIUM 40 MG: 40 TABLET, DELAYED RELEASE ORAL at 06:30

## 2020-07-25 RX ADMIN — HUMAN INSULIN 20 UNITS: 100 INJECTION, SUSPENSION SUBCUTANEOUS at 07:53

## 2020-07-25 RX ADMIN — MONTELUKAST SODIUM 10 MG: 10 TABLET, FILM COATED ORAL at 21:40

## 2020-07-25 RX ADMIN — LITHIUM CARBONATE 150 MG: 150 CAPSULE, GELATIN COATED ORAL at 07:54

## 2020-07-25 RX ADMIN — AMLODIPINE BESYLATE 5 MG: 5 TABLET ORAL at 07:54

## 2020-07-25 NOTE — PROGRESS NOTES
Problem: Falls - Risk of  Goal: *Absence of Falls  Description: Document Kay Dear Fall Risk and appropriate interventions in the flowsheet.   Outcome: Progressing Towards Goal  Note: Fall Risk Interventions:            Medication Interventions: Teach patient to arise slowly

## 2020-07-25 NOTE — BH NOTES
Hourly rounds have been completed. Resident is labile and demanding. She denies all SI/HI but endorses AVH. She was given redirection for constantly standing at the nurses station. Remains med and meal compliant. There are no noted issues with pain or discomfort. Will continue to monitor for changes.

## 2020-07-25 NOTE — BH NOTES
Assumed care of patient and given report by off going shift. Pt continues to be labile and has been in and out of her room, racing in her room standing up with her arms in the air and chanting like she is attempting to cast a spell. She is irritable at times, loud but is re direrectable. She continues to be very demanding and gets irritable when she is told she can't have something. Her speech is garbled, rapid and hard to understand at times. Pt would get really loud when speaking and then start talking is a whisper that was barely discernable. Pt denies SI/HI and AVH even when being asked about who she is talking to. Pt came out of her room at one point and wanted me to come to her room and get the people out. She said there were at least 35 people in her room and she was looking around at them and talking to them when I was in there. She stated that a black man was trying to get to her and harm her. She continues to pace in her room, chant and respond. She came out several times to ask for fluids but refuses water and is given diet gingerale. She reports no anxiety or depression. She denied pain. Pt had evening snack and even asked for more. She had BS 96 and her sliding insulin was not needed. Pt was medication compliant and wanted nothing to rest.  She has been up most all night. She finally agree to take prn zyprexa at 0413 which barely seemed to help. Pt noted to have labs from 7/22 that were never collected; pt did this morning and tolerated well, sent to lab. Pt continues to be up, chanting and pacing this morning. Laundry is in the dryer for her. Pt did attend groups. She remains on 15 min safety checks. Will continue to monitor and treat. Pt slept 4.5 hours.

## 2020-07-25 NOTE — PROGRESS NOTES
CC diet as tolerated. Suggest renal restrictions as creatinine is elevated and we don't have baseline for this pt. Other hx notable for HTN, GERD, diabetes. Pt noted to be meal compliant.   Ht: 5'3\"  Wt: 166 lb  BMI: 29.41 kg/(m^2) c/w overweight  Est energy needs: 1715 kcal, 62 g protein, 1850 mL fluids  Pt will consume > 75% of meals at follow up 7-10 days  LOS

## 2020-07-25 NOTE — INTERDISCIPLINARY ROUNDS
Behavioral Health Interdisciplinary Rounds  
  
Patient Name: Jorge Luis Roman                    Age: 77 y.o. Room/Bed:  307/01 Primary Diagnosis: Bipolar disease, manic ([facility] [address] Admission Status: CI                           
Readmission within 30 days: No 
Power of  in place: Unknown Patient requires a blocked bed: Yes Reason for blocked bed: COVID Order for blocked bed obtained: N/A Sleep hours: 4.5 Morning Labs completed per orders:  Yes, sent to lab Participation in Care/Groups:  Yes Medication Compliant?: Yes PRNS (last 24 hours): Zyprexa 2.5mg x1 Restraints (last 24 hours): No 
Substance Abuse: No                         
24 hour chart check complete:  Yes

## 2020-07-25 NOTE — BH NOTES
Weekend Coverage:  CC: \"Hi im doing good\"  Sleep:fair  Appetite:good  Incidents in last 24 hours:n/a  PRNs:zyprexa due to agitation  Thought process:Disorganized  Thought Content:Denies SI/HI. AVH      Plan:Continue current treatment plan

## 2020-07-25 NOTE — BH NOTES
Residents blood glucose was 57 at 1530. She was given two apple juices and her blood sugar is now 103. No coverage was needed at 1630. Spoke with NP Esther Palmer who gave instructions to provide additional snacks for patient and have hospitalist review NPH insulin is low blood sugars continue. Will continue to monitor this patient for changes.

## 2020-07-25 NOTE — PROGRESS NOTES
Problem: Dominga/Hypomania  Goal: *LTG: Reduce agitation, impulsivity, and pressured speech  Description: Reduce agitation, impulsivity, and pressured speech while achieving sensitivity to the consequences of behavior and having more realistic expectations.   Outcome: Not Progressing Towards Goal  Goal: *STG: Sleep about 5 hours or more per night  Outcome: Not Progressing Towards Goal

## 2020-07-25 NOTE — BH NOTES
.   Post Fall Documentation      Machias Resides witnessed by patients   fall occurred on 07/25/2020 at 1920. The answers to the following questions summarize the fall: In the patient's own words,:  · What were you attempting to do when you fell? I fell asleep in the chair  · Do you know why you fell? Because I was asleep  · Do you have any pain/discomfort or any other complaints? My head hurts   · Which part of your body made contact with the floor or other object? I fell on the floor and I think my head hit the door    Nurse:  Yajaira Was this an assisted fall? No   Was fall witnessed? Other residents   If witnessed, what part of the body made contact with the floor or other object? Right side of patients head   Patients mental status after the fall/when found:Alert and oriented   Any apparent injury: no     Immediate interventions for injury/suspected injury? Vital signs collected, blood sugar checked, on call physician paged to notify and for orders    Patient assisted back to bed? Patient wanted to stay in the [address] Name of provider notified and time, any comments? Jeancarlos Herrera gave orders for a head CT without contrast and neuro checks for the first 4 hours.  Name of family member notified and time: There is no next of kin or contact listed in the chart. Immediate VS and physical assessment documented in flow sheets. Neuro assessment every hour x 4 (for potential head injury or unwitnessed fall) documented in flow sheets.       Janie Mckeon RN

## 2020-07-26 ENCOUNTER — APPOINTMENT (OUTPATIENT)
Dept: CT IMAGING | Age: 65
DRG: 885 | End: 2020-07-26
Attending: NURSE PRACTITIONER
Payer: MEDICARE

## 2020-07-26 LAB
GLUCOSE BLD STRIP.AUTO-MCNC: 111 MG/DL (ref 65–100)
GLUCOSE BLD STRIP.AUTO-MCNC: 152 MG/DL (ref 65–100)
GLUCOSE BLD STRIP.AUTO-MCNC: 186 MG/DL (ref 65–100)
GLUCOSE BLD STRIP.AUTO-MCNC: 189 MG/DL (ref 65–100)
GLUCOSE BLD STRIP.AUTO-MCNC: 280 MG/DL (ref 65–100)
GLUCOSE BLD STRIP.AUTO-MCNC: 53 MG/DL (ref 65–100)
GLUCOSE BLD STRIP.AUTO-MCNC: 54 MG/DL (ref 65–100)
SERVICE CMNT-IMP: ABNORMAL

## 2020-07-26 PROCEDURE — 65220000001 HC RM PRIVATE PSYCH

## 2020-07-26 PROCEDURE — 70450 CT HEAD/BRAIN W/O DYE: CPT

## 2020-07-26 PROCEDURE — 82962 GLUCOSE BLOOD TEST: CPT

## 2020-07-26 PROCEDURE — 74011636637 HC RX REV CODE- 636/637: Performed by: STUDENT IN AN ORGANIZED HEALTH CARE EDUCATION/TRAINING PROGRAM

## 2020-07-26 PROCEDURE — 74011250637 HC RX REV CODE- 250/637: Performed by: PSYCHIATRY & NEUROLOGY

## 2020-07-26 PROCEDURE — 74011250637 HC RX REV CODE- 250/637: Performed by: STUDENT IN AN ORGANIZED HEALTH CARE EDUCATION/TRAINING PROGRAM

## 2020-07-26 PROCEDURE — 74011250637 HC RX REV CODE- 250/637: Performed by: NURSE PRACTITIONER

## 2020-07-26 RX ORDER — LORAZEPAM 0.5 MG/1
0.5 TABLET ORAL ONCE
Status: COMPLETED | OUTPATIENT
Start: 2020-07-27 | End: 2020-07-26

## 2020-07-26 RX ADMIN — AMLODIPINE BESYLATE 5 MG: 5 TABLET ORAL at 07:56

## 2020-07-26 RX ADMIN — OLANZAPINE 2.5 MG: 2.5 TABLET, FILM COATED ORAL at 22:44

## 2020-07-26 RX ADMIN — OLANZAPINE 2.5 MG: 2.5 TABLET, FILM COATED ORAL at 05:24

## 2020-07-26 RX ADMIN — OLANZAPINE 5 MG: 5 TABLET, FILM COATED ORAL at 16:22

## 2020-07-26 RX ADMIN — LORAZEPAM 0.5 MG: 0.5 TABLET ORAL at 23:35

## 2020-07-26 RX ADMIN — HUMAN INSULIN 20 UNITS: 100 INJECTION, SUSPENSION SUBCUTANEOUS at 08:20

## 2020-07-26 RX ADMIN — MONTELUKAST SODIUM 10 MG: 10 TABLET, FILM COATED ORAL at 21:45

## 2020-07-26 RX ADMIN — INSULIN LISPRO 5 UNITS: 100 INJECTION, SOLUTION INTRAVENOUS; SUBCUTANEOUS at 10:49

## 2020-07-26 RX ADMIN — METOPROLOL SUCCINATE 100 MG: 50 TABLET, EXTENDED RELEASE ORAL at 07:54

## 2020-07-26 RX ADMIN — LITHIUM CARBONATE 150 MG: 150 CAPSULE, GELATIN COATED ORAL at 07:56

## 2020-07-26 RX ADMIN — LITHIUM CARBONATE 300 MG: 300 CAPSULE, GELATIN COATED ORAL at 16:22

## 2020-07-26 RX ADMIN — LATANOPROST 1 DROP: 50 SOLUTION OPHTHALMIC at 17:58

## 2020-07-26 RX ADMIN — PANTOPRAZOLE SODIUM 40 MG: 40 TABLET, DELAYED RELEASE ORAL at 07:56

## 2020-07-26 NOTE — PROGRESS NOTES
Problem: Dominga/Hypomania  Goal: *STG: Comply with psychotropic medications as prescribed  Outcome: Progressing Towards Goal

## 2020-07-26 NOTE — BH NOTES
Weekend Coverage:  CC:\"can I talk with you maam\"    Sleep:reports \"poor\" due to sleeping in an unfamiliar place. Reports that she does not feel comfortable here.   Appetite:Good  Incidents in last 24 hours:n/a  PRNs:zyprexa 2.5mg 0524 this am  Thought process:Circumstantial, garbled and pressured speech  Thought Content:Denies AVH/SI/HI  Medication side effects: denies      Plan:Continue current treatment plan

## 2020-07-26 NOTE — BH NOTES
Resident did not allow  Blood sugar recheck at one hour mendoza. She did agree to have the check completed at this time. Blood sugar now 152. Resident stated she does not wish to take insulin at this time due to previous low blood sugar. Will continue to monitor.

## 2020-07-26 NOTE — BH NOTES
Report received from off going nurse, assumed care of resident. Resident is alert and oriented. She still reports that she has AVH. She believes that there are people in her room. There are no noted behavioral issues at this time. Will continue to monitor for changes.

## 2020-07-26 NOTE — BH NOTES
Assumed care for the patient following day shift at [address] [place] [address]. Patient presents as calm and cooperative. Patient had a fall at the end of the previous shift and on approach was alert and oriented x4. Patient was ordered a CT of the head but due to the CT machine being down the patient was unable to go until 0045 on 7/26/2020. Patient was cooperative with the test. At bedtime the patient had a blood glucose of 109 so their insulin was held and they were cooperative with taking their other scheduled medication. Patient woke up around 0515 and wandered into another patients room hallucinating and had to be redirected which they were cooperative with. Patient agreed to take their PRN zyprexa PO and has been in their room since. Patient denies any S/I and H/I. Patient endorses auditory and visual hallucinations. Patient slept a total of 3.5 hours.

## 2020-07-26 NOTE — BH NOTES
Resident requested to have blood sugar checked it was noted to be 54. She is alert and oriented x 3. She was given 2 apple juices and Cherylin Stairs NP was contacted. Orders taken to recheck blood sugar in an hour. There are no other issues to note at this time.

## 2020-07-26 NOTE — BH NOTES
Behavioral Health Interdisciplinary Rounds     Patient Name: Jorge Luis Roman  Age: 77 y.o. Room/Bed:  307/01  Primary Diagnosis: B[facility] disease, [facility]   Admission Status:  CI    Readmission within 30 days: No  Power of  in place: Unknown  Patient requires a blocked bed: Yes            Reason for blocked bed: COVID  Order for blocked bed obtained: N/A     Sleep hours:  3.5  Morning Labs completed per orders:   N/A    Participation in Care/Groups: Yes  Medication Compliant?: Yes  PRNS (last 24 hours): No    Restraints (last 24 hours):  No  Substance Abuse: No    24 hour chart check complete:  Yes

## 2020-07-27 LAB
ANION GAP SERPL CALC-SCNC: 7 MMOL/L (ref 5–15)
BUN SERPL-MCNC: 18 MG/DL (ref 6–20)
BUN/CREAT SERPL: 13 (ref 12–20)
CALCIUM SERPL-MCNC: 9.5 MG/DL (ref 8.5–10.1)
CHLORIDE SERPL-SCNC: 111 MMOL/L (ref 97–108)
CHOLEST SERPL-MCNC: 189 MG/DL
CO2 SERPL-SCNC: 28 MMOL/L (ref 21–32)
CREAT SERPL-MCNC: 1.43 MG/DL (ref 0.55–1.02)
DATE LAST DOSE: NORMAL
EST. AVERAGE GLUCOSE BLD GHB EST-MCNC: 131 MG/DL
GLUCOSE BLD STRIP.AUTO-MCNC: 143 MG/DL (ref 65–100)
GLUCOSE BLD STRIP.AUTO-MCNC: 143 MG/DL (ref 65–100)
GLUCOSE BLD STRIP.AUTO-MCNC: 152 MG/DL (ref 65–100)
GLUCOSE BLD STRIP.AUTO-MCNC: 167 MG/DL (ref 65–100)
GLUCOSE BLD STRIP.AUTO-MCNC: 194 MG/DL (ref 65–100)
GLUCOSE BLD STRIP.AUTO-MCNC: 84 MG/DL (ref 65–100)
GLUCOSE SERPL-MCNC: 150 MG/DL (ref 65–100)
HBA1C MFR BLD: 6.2 % (ref 4–5.6)
HDLC SERPL-MCNC: 62 MG/DL
HDLC SERPL: 3 {RATIO} (ref 0–5)
LDLC SERPL CALC-MCNC: 97 MG/DL (ref 0–100)
LIPID PROFILE,FLP: ABNORMAL
LITHIUM SERPL-SCNC: 0.74 MMOL/L (ref 0.6–1.2)
POTASSIUM SERPL-SCNC: 4.2 MMOL/L (ref 3.5–5.1)
REPORTED DOSE,DOSE: NORMAL UNITS
REPORTED DOSE/TIME,TMG: NORMAL
SERVICE CMNT-IMP: ABNORMAL
SERVICE CMNT-IMP: NORMAL
SODIUM SERPL-SCNC: 146 MMOL/L (ref 136–145)
TRIGL SERPL-MCNC: 150 MG/DL (ref ?–150)
TSH SERPL DL<=0.05 MIU/L-ACNC: 0.39 UIU/ML (ref 0.36–3.74)
VLDLC SERPL CALC-MCNC: 30 MG/DL

## 2020-07-27 PROCEDURE — 80178 ASSAY OF LITHIUM: CPT

## 2020-07-27 PROCEDURE — 36415 COLL VENOUS BLD VENIPUNCTURE: CPT

## 2020-07-27 PROCEDURE — 82962 GLUCOSE BLOOD TEST: CPT

## 2020-07-27 PROCEDURE — 74011250637 HC RX REV CODE- 250/637: Performed by: STUDENT IN AN ORGANIZED HEALTH CARE EDUCATION/TRAINING PROGRAM

## 2020-07-27 PROCEDURE — 84443 ASSAY THYROID STIM HORMONE: CPT

## 2020-07-27 PROCEDURE — 83036 HEMOGLOBIN GLYCOSYLATED A1C: CPT

## 2020-07-27 PROCEDURE — 74011636637 HC RX REV CODE- 636/637: Performed by: STUDENT IN AN ORGANIZED HEALTH CARE EDUCATION/TRAINING PROGRAM

## 2020-07-27 PROCEDURE — 74011250637 HC RX REV CODE- 250/637: Performed by: PSYCHIATRY & NEUROLOGY

## 2020-07-27 PROCEDURE — 80048 BASIC METABOLIC PNL TOTAL CA: CPT

## 2020-07-27 PROCEDURE — 80061 LIPID PANEL: CPT

## 2020-07-27 PROCEDURE — 65220000001 HC RM PRIVATE PSYCH

## 2020-07-27 RX ADMIN — PANTOPRAZOLE SODIUM 40 MG: 40 TABLET, DELAYED RELEASE ORAL at 06:24

## 2020-07-27 RX ADMIN — LITHIUM CARBONATE 300 MG: 300 CAPSULE, GELATIN COATED ORAL at 16:37

## 2020-07-27 RX ADMIN — OLANZAPINE 5 MG: 5 TABLET, FILM COATED ORAL at 16:36

## 2020-07-27 RX ADMIN — HUMAN INSULIN 20 UNITS: 100 INJECTION, SUSPENSION SUBCUTANEOUS at 08:16

## 2020-07-27 RX ADMIN — MONTELUKAST SODIUM 10 MG: 10 TABLET, FILM COATED ORAL at 21:25

## 2020-07-27 RX ADMIN — HUMAN INSULIN 20 UNITS: 100 INJECTION, SUSPENSION SUBCUTANEOUS at 16:37

## 2020-07-27 RX ADMIN — AMLODIPINE BESYLATE 5 MG: 5 TABLET ORAL at 08:16

## 2020-07-27 RX ADMIN — METOPROLOL SUCCINATE 100 MG: 50 TABLET, EXTENDED RELEASE ORAL at 08:16

## 2020-07-27 RX ADMIN — INSULIN LISPRO 2 UNITS: 100 INJECTION, SOLUTION INTRAVENOUS; SUBCUTANEOUS at 11:53

## 2020-07-27 RX ADMIN — LATANOPROST 1 DROP: 50 SOLUTION OPHTHALMIC at 18:38

## 2020-07-27 RX ADMIN — OLANZAPINE 5 MG: 5 TABLET, FILM COATED ORAL at 08:16

## 2020-07-27 RX ADMIN — INSULIN LISPRO 2 UNITS: 100 INJECTION, SOLUTION INTRAVENOUS; SUBCUTANEOUS at 08:16

## 2020-07-27 RX ADMIN — LITHIUM CARBONATE 150 MG: 150 CAPSULE, GELATIN COATED ORAL at 08:16

## 2020-07-27 NOTE — GROUP NOTE
ARNALDO  GROUP DOCUMENTATION INDIVIDUAL Group Therapy Note Date: 7/27/2020 Group Start Time: 1500 Group End Time: 0945 Group Topic: Recreational/Music Therapy [facility] [place] [address] ACUTE BEHAV [facility] [place], [address] GROUP DOCUMENTATION GROUP Group Therapy Note Attendees: 7 Attendance: Attended Patient's Goal:  To concentrate on selected task Interventions/techniques: Supported-crafts,games,music Follows Directions: Followed directions Interactions: Interacted appropriately Mental Status: Calm Behavior/appearance: Attentive and Cooperative Goals Achieved: Able to engage in interactions and Able to listen to others Additional Notes:   
 
Jhoan Scott

## 2020-07-27 NOTE — BH NOTES
Pt report received from outgoing nurse. Pt is oriented to name, place, and time. She does not know the situation as patient states, \"I was perfectly healthy when I got here; I do not need to be here. \"   She has appropriate speech this morning and is able to concentrate better with having conversation. She is smiling. Pt is meal and medication compliant. She denies SI, HI, and AVH; Nurse observes patient in the room by herself responding to stimuli. Pt is pleasant and cooperative. Pt bilateral feet are edematous, but no longer pitting. Nurse instructed and propped patient feet up. Pt blood sugars were 143, 194, 84 today.

## 2020-07-27 NOTE — PROGRESS NOTES
Laboratory monitoring for mood stabilizer and antipsychotics:    Recommended baseline monitoring has been completed based on this patient's current medication regimen. The patient is currently taking the following medication(s):   Current Facility-Administered Medications   Medication Dose Route Frequency    OLANZapine (ZyPREXA) tablet 5 mg  5 mg Oral BID    metoprolol succinate (TOPROL-XL) XL tablet 100 mg  100 mg Oral DAILY    lithium carbonate capsule 300 mg  300 mg Oral DAILY WITH DINNER    lithium carbonate capsule 150 mg  150 mg Oral DAILY WITH BREAKFAST    montelukast (SINGULAIR) tablet 10 mg  10 mg Oral QHS    insulin lispro (HUMALOG) injection   SubCUTAneous AC&HS    insulin NPH (NOVOLIN N, HUMULIN N) injection 20 Units  20 Units SubCUTAneous ACB&D    pantoprazole (PROTONIX) tablet 40 mg  40 mg Oral ACB    amLODIPine (NORVASC) tablet 5 mg  5 mg Oral DAILY    latanoprost (XALATAN) 0.005 % ophthalmic solution 1 Drop  1 Drop Both Eyes QPM       Serum Drug Level(s)  LITHIUM  Lab Results   Component Value Date/Time    Lithium level 0.74 07/27/2020 05:03 AM       Height, Weight, BMI Estimation  Estimated body mass index is 29.41 kg/m² as calculated from the following:    Height as of this encounter: 160 cm (63\"). Weight as of this encounter: 75.3 kg (166 lb). Renal Function, Hepatic Function and Chemistry  Estimated Creatinine Clearance: 37.1 mL/min (A) (based on SCr of 1.43 mg/dL (H)).     Lab Results   Component Value Date/Time    Sodium 146 (H) 07/27/2020 05:03 AM    Potassium 4.2 07/27/2020 05:03 AM    Chloride 111 (H) 07/27/2020 05:03 AM    CO2 28 07/27/2020 05:03 AM    Anion gap 7 07/27/2020 05:03 AM    Glucose 150 (H) 07/27/2020 05:03 AM    BUN 18 07/27/2020 05:03 AM    Creatinine 1.43 (H) 07/27/2020 05:03 AM    BUN/Creatinine ratio 13 07/27/2020 05:03 AM    GFR est AA 44 (L) 07/27/2020 05:03 AM    GFR est non-AA 37 (L) 07/27/2020 05:03 AM    Calcium 9.5 07/27/2020 05:03 AM    ALT (SGPT) 67 07/21/2020 12:48 AM    Alk.  phosphatase 92 07/21/2020 12:48 AM    Protein, total 7.2 07/21/2020 12:48 AM    Albumin 3.6 07/21/2020 12:48 AM    Globulin 3.6 07/21/2020 12:48 AM    A-G Ratio 1.0 (L) 07/21/2020 12:48 AM    Bilirubin, total 0.4 07/21/2020 12:48 AM       Lab Results   Component Value Date/Time    Glucose 150 (H) 07/27/2020 05:03 AM    Glucose (POC) 143 (H) 07/27/2020 07:58 AM       Lab Results   Component Value Date/Time    Hemoglobin A1c 6.2 (H) 07/27/2020 06:13 AM       Hematology  Lab Results   Component Value Date/Time    WBC 8.8 07/25/2020 05:16 AM    HGB 13.3 07/25/2020 05:16 AM    HCT 39.5 07/25/2020 05:16 AM    PLATELET 185 49/43/2727 05:16 AM    MCV 93.2 07/25/2020 05:16 AM       Lipids  Lab Results   Component Value Date/Time    Cholesterol, total 189 07/27/2020 06:13 AM    HDL Cholesterol 62 07/27/2020 06:13 AM    LDL, calculated 97 07/27/2020 06:13 AM    Triglyceride 150 (H) 07/27/2020 06:13 AM    CHOL/HDL Ratio 3.0 07/27/2020 06:13 AM       Thyroid Function    Lab Results   Component Value Date/Time    TSH 0.39 07/27/2020 05:03 AM     Vitals  Visit Vitals  /49 (BP 1 Location: Right arm, BP Patient Position: Sitting)   Pulse (!) 57   Temp 97.8 °F (36.6 °C)   Resp 18   Ht 160 cm (63\")   Wt 75.3 kg (166 lb)   SpO2 100%   BMI 29.41 kg/m²       Altagracia Curtis, PharmD, BCPS  [phone] (pharmacy)

## 2020-07-27 NOTE — PROGRESS NOTES
2000: Assumed care of patient after receiving shift report from outgoing nurse. : Reported she feels BG low. Difficulties with accucheck. Given 1 OJ while exchanging accucheck. When BG checked 189. No further intervention required. : Patient has been out and visible on unit, interacting frequently with staff. Cherelle makes eye contact. A&O x 4. Affect is blunted, mood is generally pleasant. Hygiene is adequate, independent in ADLs. Gait is steady. Sleep and appetite patterns WNL. Evening SSI was held per order. Denies SI/HI. Denies hallucinations but then asked staff to come in room. She indicates that she sees writing on her pillow and walls when there is nothing there. Zyprexa prn given. Will monitor. 2330: Patient delusional regarding family \"railroading me\" and \"trying to take all my money. Pressured, rapid speech. Believes her sister  this evening. Talks about the devil being \"around me. \" Estefania Archuleta NP notified and orders received for 1 x 0.5 mg ativan dose given at this time. Provided music and therapeutic listening in the in the hallway at this time. Will continue to monitor. 0005: Cherelle is calm and states she is ready for bed. She c/o foot swelling and she is noted to have +1 non pitting edema in b feet. She was provided with pillows in bed and feet are elevated. She is resting in bed with even respirations. Will continue to monitor. 0330: Patient has been up in room x 2. Observed standing in middle of room with arms closed chanting or yelling loudly while making slow arm movements. Able to be redirected back to bed. She came out in the [place] and told staff she needs to  groceries for her mother. Walked back to bed. BG checked as nursing measure as she has had incidents of hypoglycemia and result was 152. Assisted her to lay back down with feet elevated. 0592: Blood drawn this a.m. Patient slept a total of 5 hours. Will monitor.      Problem: Dominga/Hypomania  Goal: *LTG: Talk about underlying feelings of low self-esteem or guilt and fears of rejection, dependency, and abandonment  Outcome: Progressing Towards Goal  Goal: *STG: Be less agitated and distracted  Description: Be less agitated and distracted, e.g. be able to sit quietly and calmly for 10 minutes  Outcome: Progressing Towards Goal

## 2020-07-27 NOTE — BH NOTES
Psychiatric Progress Note    Patient: Augustine Davis MRN: [id number]  SSN: [id number]    YOB: 1955  Age: 77 y.o. Sex: female      Admit Date: 7/21/2020    LOS: 6 days     Subjective:     Augustine Davis  reports feeling alright, but presents to staff as paranoid seeing snakes in her room. Moods are labile. Upset with her passing friends and family. Does not feel she is heard. Denies SI/HI/AH/VH. No aggression or violence. Responds to internal stimuli and beat up her pillows. Interactive with minimal awareness. Tolerating medications well when taken. Eating well and sleeping well. 7/24 - Augustnie Davis continues to present as labile to staff, but today was direct in her responses to this interviewer. Garbled speech but appropriate nods to questions. Reports feeling well and moods are good. Denies SI/HI/AH/VH. No aggression or violence. Appropriately interactive and aware. Tolerating medications well. Eating and sleeping fairly. Notes having a conflict with her older sister but is ready to leave soon. 7/27 - Cherelle Davila reports feeling better and moods are good. Last evening she was tearful and delusional about her sister supposedly passing away. (This did not happen). Today she proceeded to describe her entire situation from a few months back expressing direct conversations and statements. Denies SI/HI/AH/VH. No aggression or violence. Appropriately interactive and aware. Tolerating medications well. Eating and sleeping fairly. She apparently fell and hit her head over the weekend and a head CT was obtained and showed no acute findings.     Objective:     Vitals:    07/25/20 1950 07/26/20 0800 07/26/20 2000 07/27/20 0824   BP: 121/56 134/68 122/52 128/49   Pulse: 60 71 62 (!) 57   Resp: 18 18 18 18   Temp: 97.8 °F (36.6 °C) 98.4 °F (36.9 °C) 97.5 °F (36.4 °C) 97.8 °F (36.6 °C)   SpO2: 100% 100% 100% 100%   Weight:       Height:            Mental Status Exam: Sensorium  confused   Relations cooperative   Eye Contact appropriate   Appearance:  older than stated age   Speech:  hyperverbal and garbled   Thought Process: circumstantial and tangential   Thought Content delusions and preoccupations   Suicidal ideations none   Mood:  \"I'm Fine\"   Affect:  labile   Memory   impaired   Concentration:  impaired   Insight:  limited   Judgment:  impaired due to Condition       MEDICATIONS:  Current Facility-Administered Medications   Medication Dose Route Frequency    OLANZapine (ZyPREXA) tablet 5 mg  5 mg Oral BID    fluticasone propionate (FLONASE) 50 mcg/actuation nasal spray 2 Spray  2 Spray Both Nostrils DAILY PRN    diphenhydrAMINE (BENADRYL) injection 25 mg  25 mg IntraMUSCular Q6H PRN    OLANZapine (ZyPREXA) tablet 2.5 mg  2.5 mg Oral Q6H PRN    haloperidol lactate (HALDOL) injection 2.5 mg  2.5 mg IntraMUSCular Q6H PRN    benztropine (COGENTIN) tablet 0.5 mg  0.5 mg Oral BID PRN    magnesium hydroxide (MILK OF MAGNESIA) 400 mg/5 mL oral suspension 30 mL  30 mL Oral DAILY PRN    metoprolol succinate (TOPROL-XL) XL tablet 100 mg  100 mg Oral DAILY    lithium carbonate capsule 300 mg  300 mg Oral DAILY WITH DINNER    lithium carbonate capsule 150 mg  150 mg Oral DAILY WITH BREAKFAST    montelukast (SINGULAIR) tablet 10 mg  10 mg Oral QHS    glucose chewable tablet 16 g  4 Tab Oral PRN    glucagon (GLUCAGEN) injection 1 mg  1 mg IntraMUSCular PRN    dextrose 10% infusion 0-250 mL  0-250 mL IntraVENous PRN    insulin lispro (HUMALOG) injection   SubCUTAneous AC&HS    insulin NPH (NOVOLIN N, HUMULIN N) injection 20 Units  20 Units SubCUTAneous ACB&D    pantoprazole (PROTONIX) tablet 40 mg  40 mg Oral ACB    amLODIPine (NORVASC) tablet 5 mg  5 mg Oral DAILY    latanoprost (XALATAN) 0.005 % ophthalmic solution 1 Drop  1 Drop Both Eyes QPM      DISCUSSION:   the risks and benefits of the proposed medication  patient given opportunity to ask questions    Lab/Data Review: All lab results for the last 24 hours reviewed.      Recent Results (from the past 24 hour(s))   GLUCOSE, POC    Collection Time: 07/26/20  4:04 PM   Result Value Ref Range    Glucose (POC) 152 (H) 65 - 100 mg/dL    Performed by Javier Villalba    GLUCOSE, POC    Collection Time: 07/26/20  7:53 PM   Result Value Ref Range    Glucose (POC) 186 (H) 65 - 100 mg/dL    Performed by Alexys Amor RN    GLUCOSE, POC    Collection Time: 07/26/20  9:40 PM   Result Value Ref Range    Glucose (POC) 189 (H) 65 - 100 mg/dL    Performed by Alexys Amor RN    GLUCOSE, POC    Collection Time: 07/27/20  3:29 AM   Result Value Ref Range    Glucose (POC) 152 (H) 65 - 100 mg/dL    Performed by Herman Patiño    TSH 3RD GENERATION    Collection Time: 07/27/20  5:03 AM   Result Value Ref Range    TSH 0.39 0.36 - 3.74 uIU/mL   LITHIUM    Collection Time: 07/27/20  5:03 AM   Result Value Ref Range    Lithium level 0.74 0.60 - 1.20 MMOL/L    Reported dose date: NOT PROVIDED      Reported dose time: NOT PROVIDED      Reported dose: NOT PROVIDED UNITS   METABOLIC PANEL, BASIC    Collection Time: 07/27/20  5:03 AM   Result Value Ref Range    Sodium 146 (H) 136 - 145 mmol/L    Potassium 4.2 3.5 - 5.1 mmol/L    Chloride 111 (H) 97 - 108 mmol/L    CO2 28 21 - 32 mmol/L    Anion gap 7 5 - 15 mmol/L    Glucose 150 (H) 65 - 100 mg/dL    BUN 18 6 - 20 MG/DL    Creatinine 1.43 (H) 0.55 - 1.02 MG/DL    BUN/Creatinine ratio 13 12 - 20      GFR est AA 44 (L) >60 ml/min/1.73m2    GFR est non-AA 37 (L) >60 ml/min/1.73m2    Calcium 9.5 8.5 - 10.1 MG/DL   HEMOGLOBIN A1C WITH EAG    Collection Time: 07/27/20  6:13 AM   Result Value Ref Range    Hemoglobin A1c 6.2 (H) 4.0 - 5.6 %    Est. average glucose 131 mg/dL   LIPID PANEL    Collection Time: 07/27/20  6:13 AM   Result Value Ref Range    LIPID PROFILE          Cholesterol, total 189 <200 MG/DL    Triglyceride 150 (H) <150 MG/DL    HDL Cholesterol 62 MG/DL    LDL, calculated 97 0 - 100 MG/DL    VLDL, calculated 30 MG/DL    CHOL/HDL Ratio 3.0 0.0 - 5.0     GLUCOSE, POC    Collection Time: 07/27/20  7:58 AM   Result Value Ref Range    Glucose (POC) 143 (H) 65 - 100 mg/dL    Performed by Richmond Martell LPN    GLUCOSE, POC    Collection Time: 07/27/20 11:42 AM   Result Value Ref Range    Glucose (POC) 194 (H) 65 - 100 mg/dL    Performed by Casshoperileatha RomanPeoria LPN          Assessment:     Principal Problem:    Bipolar disease, manic ([facility] [address] (7/21/2020)        Plan:     Continue current care  Collateral information  Disposition planning with social work    Signed By: Angie Brar MD     July 27, 2020

## 2020-07-27 NOTE — PROGRESS NOTES
Laboratory Monitoring for Lithium    This patient is currently prescribed the following medication(s):   Current Facility-Administered Medications   Medication Dose Route Frequency    OLANZapine (ZyPREXA) tablet 5 mg  5 mg Oral BID    metoprolol succinate (TOPROL-XL) XL tablet 100 mg  100 mg Oral DAILY    lithium carbonate capsule 300 mg  300 mg Oral DAILY WITH DINNER    lithium carbonate capsule 150 mg  150 mg Oral DAILY WITH BREAKFAST    montelukast (SINGULAIR) tablet 10 mg  10 mg Oral QHS    insulin lispro (HUMALOG) injection   SubCUTAneous AC&HS    insulin NPH (NOVOLIN N, HUMULIN N) injection 20 Units  20 Units SubCUTAneous ACB&D    pantoprazole (PROTONIX) tablet 40 mg  40 mg Oral ACB    amLODIPine (NORVASC) tablet 5 mg  5 mg Oral DAILY    latanoprost (XALATAN) 0.005 % ophthalmic solution 1 Drop  1 Drop Both Eyes QPM       The following labs have been completed for monitoring of lithium:    Lithium Serum Concentration  Lab Results   Component Value Date/Time    Lithium level 0.74 07/27/2020 05:03 AM         Renal Function and Chemistry  Lab Results   Component Value Date/Time    Sodium 146 (H) 07/27/2020 05:03 AM    Potassium 4.2 07/27/2020 05:03 AM    Chloride 111 (H) 07/27/2020 05:03 AM    CO2 28 07/27/2020 05:03 AM    Anion gap 7 07/27/2020 05:03 AM    BUN 18 07/27/2020 05:03 AM    Creatinine 1.43 (H) 07/27/2020 05:03 AM    BUN/Creatinine ratio 13 07/27/2020 05:03 AM       Assessment/Plan:  Lithium level was drawn on 7/27/20 at 0503 and is therapeutic, 0.74 mmol/L. (0.6-1.2 mmol/L)  Lithium level was drawn 12.5 hours post last dose and is in steady state. BMP lab drawn on 7/27/20 at 0503 AM shows slightly improved but still elevated creatinine. Continue to monitor renal function. Continue current lithium regimen.        Audi Conley  PharmD Candidate [phone] (pharmacy)

## 2020-07-27 NOTE — PROGRESS NOTES
Problem: Dominga/Hypomania  Goal: *LTG: Reduce psychic energy and return to normal activity levels, good judgement, stable mood, and goal-directed behavior  Outcome: Progressing Towards Goal  Goal: *LTG: Reduce agitation, impulsivity, and pressured speech  Description: Reduce agitation, impulsivity, and pressured speech while achieving sensitivity to the consequences of behavior and having more realistic expectations.   Outcome: Progressing Towards Goal  Goal: *STG: Sleep about 5 hours or more per night  Outcome: Progressing Towards Goal  Goal: *STG: Speak more slowly and be more subject-focused  Outcome: Progressing Towards Goal  Goal: *STG: Comply with psychotropic medications as prescribed  Outcome: Progressing Towards Goal

## 2020-07-27 NOTE — INTERDISCIPLINARY ROUNDS
Behavioral Health Interdisciplinary Rounds Patient Name: Megan Miguel  Age: 77 y.o. Room/Bed:  [facility]/01 Primary Diagnosis: [facility] Admission Status: Involuntary Commitment Readmission within 30 days: no 
Power of  in place: no 
Patient requires a blocked bed: no           
Reason for blocked bed: n/a Order for blocked bed obtained: n/a Sleep hours: 5 Morning Labs completed per orders:  yes Participation in Care/Groups:  yes Medication Compliant?: Yes PRNS (last 24 hours): Antianxiety antipsychotic Restraints (last 24 hours):  no 
Substance Abuse:  no   
24 hour chart check complete: yes

## 2020-07-27 NOTE — GROUP NOTE
ARNALDO  GROUP DOCUMENTATION INDIVIDUAL Group Therapy Note Date: 7/27/2020 Group Start Time: 1100 Group End Time: 1200 Group Topic: Topic Group [address] ACUTE BEHAV [facility], [address] GROUP DOCUMENTATION GROUP Group Therapy Note Attendees: 10 Attendance: Attended Patient's Goal:  To participate in mental health journey game Interventions/techniques: Supported-choices in recovery Follows Directions: Followed directions Interactions: Interacted appropriately Mental Status: Calm Behavior/appearance: Attentive and Cooperative Goals Achieved: Able to engage in interactions, Able to listen to others, Able to self-disclose and Discussed coping Additional Notes:   
 
Taniya Dickey

## 2020-07-28 LAB
GLUCOSE BLD STRIP.AUTO-MCNC: 103 MG/DL (ref 65–100)
GLUCOSE BLD STRIP.AUTO-MCNC: 130 MG/DL (ref 65–100)
GLUCOSE BLD STRIP.AUTO-MCNC: 143 MG/DL (ref 65–100)
GLUCOSE BLD STRIP.AUTO-MCNC: 153 MG/DL (ref 65–100)
SERVICE CMNT-IMP: ABNORMAL

## 2020-07-28 PROCEDURE — 74011250637 HC RX REV CODE- 250/637: Performed by: PSYCHIATRY & NEUROLOGY

## 2020-07-28 PROCEDURE — 74011636637 HC RX REV CODE- 636/637: Performed by: STUDENT IN AN ORGANIZED HEALTH CARE EDUCATION/TRAINING PROGRAM

## 2020-07-28 PROCEDURE — 82962 GLUCOSE BLOOD TEST: CPT

## 2020-07-28 PROCEDURE — 65220000001 HC RM PRIVATE PSYCH

## 2020-07-28 PROCEDURE — 74011250637 HC RX REV CODE- 250/637: Performed by: STUDENT IN AN ORGANIZED HEALTH CARE EDUCATION/TRAINING PROGRAM

## 2020-07-28 RX ORDER — LANOLIN ALCOHOL/MO/W.PET/CERES
3 CREAM (GRAM) TOPICAL
Status: DISCONTINUED | OUTPATIENT
Start: 2020-07-28 | End: 2020-07-29

## 2020-07-28 RX ORDER — LANOLIN ALCOHOL/MO/W.PET/CERES
3 CREAM (GRAM) TOPICAL EVERY EVENING
Status: DISCONTINUED | OUTPATIENT
Start: 2020-07-28 | End: 2020-07-28

## 2020-07-28 RX ADMIN — METOPROLOL SUCCINATE 100 MG: 50 TABLET, EXTENDED RELEASE ORAL at 08:37

## 2020-07-28 RX ADMIN — OLANZAPINE 5 MG: 5 TABLET, FILM COATED ORAL at 16:45

## 2020-07-28 RX ADMIN — LITHIUM CARBONATE 150 MG: 150 CAPSULE, GELATIN COATED ORAL at 08:37

## 2020-07-28 RX ADMIN — MELATONIN 3 MG: at 20:46

## 2020-07-28 RX ADMIN — INSULIN LISPRO 2 UNITS: 100 INJECTION, SOLUTION INTRAVENOUS; SUBCUTANEOUS at 12:27

## 2020-07-28 RX ADMIN — AMLODIPINE BESYLATE 5 MG: 5 TABLET ORAL at 08:38

## 2020-07-28 RX ADMIN — HUMAN INSULIN 20 UNITS: 100 INJECTION, SUSPENSION SUBCUTANEOUS at 08:38

## 2020-07-28 RX ADMIN — LITHIUM CARBONATE 300 MG: 300 CAPSULE, GELATIN COATED ORAL at 16:45

## 2020-07-28 RX ADMIN — HUMAN INSULIN 20 UNITS: 100 INJECTION, SUSPENSION SUBCUTANEOUS at 16:53

## 2020-07-28 RX ADMIN — MONTELUKAST SODIUM 10 MG: 10 TABLET, FILM COATED ORAL at 20:46

## 2020-07-28 RX ADMIN — OLANZAPINE 5 MG: 5 TABLET, FILM COATED ORAL at 08:38

## 2020-07-28 RX ADMIN — PANTOPRAZOLE SODIUM 40 MG: 40 TABLET, DELAYED RELEASE ORAL at 06:48

## 2020-07-28 RX ADMIN — LATANOPROST 1 DROP: 50 SOLUTION OPHTHALMIC at 17:27

## 2020-07-28 NOTE — PROGRESS NOTES
Problem: Dominga/Hypomania  Goal: *LTG: Reduce psychic energy and return to normal activity levels, good judgement, stable mood, and goal-directed behavior  Outcome: Progressing Towards Goal  Goal: *STG: Comply with psychotropic medications as prescribed  Outcome: Progressing Towards Goal

## 2020-07-28 NOTE — PROGRESS NOTES
Problem: Dominga/Hypomania  Goal: *LTG: Reduce psychic energy and return to normal activity levels, good judgement, stable mood, and goal-directed behavior  Outcome: Progressing Towards Goal  Goal: *LTG: Reduce agitation, impulsivity, and pressured speech  Description: Reduce agitation, impulsivity, and pressured speech while achieving sensitivity to the consequences of behavior and having more realistic expectations. Outcome: Progressing Towards Goal  Goal: *STG: Sleep about 5 hours or more per night  Outcome: Not Progressing Towards Goal  Goal: Dominga/Hypomania Interventions  Outcome: Progressing Towards Goal     0717 This writer received report from the outgoing nurse. 0745 Patient in room sitting. Patient presents with a guarded attitude, blunted affect, and euthymic mood. Patient continues to be paranoid. Patient is restless at times. Patient with bizarre words and delusional. Patient thinking her sister is dying. Patient is alert and oriented x 4. Patient denied SI, HI, and AVH. 4468 Patient is med and meal compliant. Patient ate most of her breakfast. Patient is diabetic. Patient's am FT=222.     2606 Patient in room resting.

## 2020-07-28 NOTE — INTERDISCIPLINARY ROUNDS
[facility] Interdisciplinary Rounds Patient Name: Jorge Luis Roman  Age: 77 y.o. Room/Bed:  307/01 Primary Diagnosis: Bipolar disease, manic ([facility] [address] Admission Status: Involuntary Commitment Readmission within 30 days: no 
Power of  in place: unk Patient requires a blocked bed: no           
Reason for blocked bed:  N/A Order for blocked bed obtained: no    
 
Sleep hours: 1 1/2 hrs Morning Labs completed per orders:  na      
Participation in Care/Groups:  yes Medication Compliant?: Yes PRNS (last 24 hours): None Restraints (last 24 hours):  no 
Substance Abuse:  no   
24 hour chart check complete: yes Behavioral Health Treatment Team Note  
  
Patient goal(s) for today: Take medications as prescribed.  
Treatment team focus/goals:  Stabilize on medication  
Progress note: Pt's mood is  less labile and less paranoid delusions.  
  
LOS:  7               Expected LOS: TBD 
  
Financial concerns/prescription coverage: [employer] Medicare Family contact: Willi Furman Leventhal [phone] - VM left Family requesting physician contact today: No 
Discharge plan: Return home Access to weapons: No 
Outpatient provider(s): Dr. Barbara Ely Patient's preferred phone number for follow up [phone] 
  
Participating treatment team members: Paulette Romana Bussing, MSW; Dr. Adrien Presley MD

## 2020-07-28 NOTE — BH NOTES
Behavioral Health Treatment Team Note     Patient goal(s) for today: Take medications as prescribed.    Treatment team focus/goals:  Stabilize on medication   Progress note: Pt's mood is labile and thought process remains illogical with paranoid delusions.      LOS:  6                   Expected LOS: TBD     Financial concerns/prescription coverage: [employer] Medicare  Family contact: Theodore Juárez [phone] - VM left   Family requesting physician contact today: No  Discharge plan: Return home  Access to weapons: No  Outpatient provider(s): Dr. Ariella Frias  Patient's preferred phone number for follow up [phone]     Participating treatment team members: PEPE Shelton; Dr. Nicole Gunter MD

## 2020-07-28 NOTE — BH NOTES
Psychiatric Progress Note    Patient: Cyrena Hatchet MRN: [id number]  SSN: [id number]    YOB: 1955  Age: 77 y.o. Sex: female      Admit Date: 7/21/2020    LOS: 7 days     Subjective:     Cyrena Hatchet  reports feeling alright, but presents to staff as paranoid seeing snakes in her room. Moods are labile. Upset with her passing friends and family. Does not feel she is heard. Denies SI/HI/AH/VH. No aggression or violence. Responds to internal stimuli and beat up her pillows. Interactive with minimal awareness. Tolerating medications well when taken. Eating well and sleeping well. 7/24 - Cyrena Hatchet continues to present as labile to staff, but today was direct in her responses to this interviewer. Garbled speech but appropriate nods to questions. Reports feeling well and moods are good. Denies SI/HI/AH/VH. No aggression or violence. Appropriately interactive and aware. Tolerating medications well. Eating and sleeping fairly. Notes having a conflict with her older sister but is ready to leave soon. 7/27 - Cherelle Davila reports feeling better and moods are good. Last evening she was tearful and delusional about her sister supposedly passing away. (This did not happen). Today she proceeded to describe her entire situation from a few months back expressing direct conversations and statements. Denies SI/HI/AH/VH. No aggression or violence. Appropriately interactive and aware. Tolerating medications well. Eating and sleeping fairly. She apparently fell and hit her head over the weekend and a head CT was obtained and showed no acute findings. 7/28 -Cherelle Davila reports feeling better today and is more calm than the previous days. Spoke about her diabetes in the third person (he likes coca cola). Moods are good. Denies SI/HI/AH/VH. No aggression or violence.   Still responding to internal stimuli at night per nursing and did not sleep (1hr) Appropriately interactive and aware. Tolerating medications well. Eating fairly.       Objective:     Vitals:    07/27/20 0930 07/27/20 2000 07/28/20 0745 07/28/20 0840   BP:  118/60 115/62 118/68   Pulse: 71 76 61 72   Resp:  20 16    Temp:  97.7 °F (36.5 °C) 98.3 °F (36.8 °C)    SpO2:  94% 100%    Weight:       Height:            Mental Status Exam:   Sensorium  confused   Relations cooperative   Eye Contact appropriate   Appearance:  older than stated age   Speech:  hyperverbal and garbled   Thought Process: circumstantial and tangential   Thought Content delusions and preoccupations   Suicidal ideations none   Mood:  \"I'm Fine\"   Affect:  labile   Memory   impaired   Concentration:  impaired   Insight:  limited   Judgment:  impaired due to Condition       MEDICATIONS:  Current Facility-Administered Medications   Medication Dose Route Frequency    OLANZapine (ZyPREXA) tablet 5 mg  5 mg Oral BID    fluticasone propionate (FLONASE) 50 mcg/actuation nasal spray 2 Spray  2 Spray Both Nostrils DAILY PRN    diphenhydrAMINE (BENADRYL) injection 25 mg  25 mg IntraMUSCular Q6H PRN    OLANZapine (ZyPREXA) tablet 2.5 mg  2.5 mg Oral Q6H PRN    haloperidol lactate (HALDOL) injection 2.5 mg  2.5 mg IntraMUSCular Q6H PRN    benztropine (COGENTIN) tablet 0.5 mg  0.5 mg Oral BID PRN    magnesium hydroxide (MILK OF MAGNESIA) 400 mg/5 mL oral suspension 30 mL  30 mL Oral DAILY PRN    metoprolol succinate (TOPROL-XL) XL tablet 100 mg  100 mg Oral DAILY    lithium carbonate capsule 300 mg  300 mg Oral DAILY WITH DINNER    lithium carbonate capsule 150 mg  150 mg Oral DAILY WITH BREAKFAST    montelukast (SINGULAIR) tablet 10 mg  10 mg Oral QHS    glucose chewable tablet 16 g  4 Tab Oral PRN    glucagon (GLUCAGEN) injection 1 mg  1 mg IntraMUSCular PRN    dextrose 10% infusion 0-250 mL  0-250 mL IntraVENous PRN    insulin lispro (HUMALOG) injection   SubCUTAneous AC&HS    insulin NPH (NOVOLIN N, HUMULIN N) injection 20 Units  20 Units SubCUTAneous ACB&D    pantoprazole (PROTONIX) tablet 40 mg  40 mg Oral ACB    amLODIPine (NORVASC) tablet 5 mg  5 mg Oral DAILY    latanoprost (XALATAN) 0.005 % ophthalmic solution 1 Drop  1 Drop Both Eyes QPM      DISCUSSION:   the risks and benefits of the proposed medication  patient given opportunity to ask questions    Lab/Data Review: All lab results for the last 24 hours reviewed.      Recent Results (from the past 24 hour(s))   GLUCOSE, POC    Collection Time: 07/27/20 11:42 AM   Result Value Ref Range    Glucose (POC) 194 (H) 65 - 100 mg/dL    Performed by Richmond Romanole LPN    GLUCOSE, POC    Collection Time: 07/27/20  4:24 PM   Result Value Ref Range    Glucose (POC) 84 65 - 100 mg/dL    Performed by Richmond Romanole LPN    GLUCOSE, POC    Collection Time: 07/27/20  8:11 PM   Result Value Ref Range    Glucose (POC) 167 (H) 65 - 100 mg/dL    Performed by Gatito Saleh    GLUCOSE, POC    Collection Time: 07/27/20 11:57 PM   Result Value Ref Range    Glucose (POC) 143 (H) 65 - 100 mg/dL    Performed by Gatito Saleh    GLUCOSE, POC    Collection Time: 07/28/20  7:41 AM   Result Value Ref Range    Glucose (POC) 130 (H) 65 - 100 mg/dL    Performed by Rey Carlin (RN)          Assessment:     Principal Problem:    Bipolar disease, manic ([facility] (7/21/2020)        Plan:     Continue current care  Collateral information  Disposition planning with social work for the next few days    Signed By: Angie Brar MD     July 28, 2020

## 2020-07-28 NOTE — BH NOTES
Assumed nursing care of Cherelle after receiving the 1900 change of shift report. Miguel Angel Cohn is a almost baldheaded female with quite a bit of facial hair. She has been visible in the milieu, walked about, watched some TV but mostly wanting to talk to peers or staff. She can be intrusive at times with a loud tone of voice and hyperverbal.  Pt is resistant to being asked any questions and denied all problems including SI, HI and all s/s of psychosis such as AV hallucinations. However, pt does respond to internal stimuli AEB talking heard when pt is alone in her room. She was compliant with her HS med but refused any need for prn meds. Maintaining close monitoring for mood chgs, behavior and for safety. Addendum at 200:  Pt is yelling and constantly talking outside of her doorway. She wants to grab and lean onto staff. Prior to this at 0000 pt was at the nursing station and was having difficulty completing a sentence, she c/o low blood sugar. Bld sugar at that time was 143. Her HS POC blood sugar was 176. Addendum:  Pt has slept 1 1/2 hrs. She has been quieter since 0315.

## 2020-07-28 NOTE — GROUP NOTE
ARNALDO  GROUP DOCUMENTATION INDIVIDUAL Group Therapy Note Date: 7/28/2020 Group Start Time: 1500 Group End Time: 4420 Group Topic: Recreational/Music Therapy [facility] [place] [address] ACUTE BEHAV [facility] [place], [address] GROUP DOCUMENTATION GROUP Group Therapy Note Attendees: 6 Attendance: Attended Patient's Goal:  To concentrate on selected task Interventions/techniques: Supported-crafts,games,music Follows Directions: Did not follow directions Interactions: Disorganized interaction Mental Status: Anxious Behavior/appearance: Needed prompting Goals Achieved: Additional Notes:  Preoccupied-loose/bizzare thoughts-hyper verbal,intrusive Lurlean Mass

## 2020-07-28 NOTE — GROUP NOTE
ARNALDO  GROUP DOCUMENTATION INDIVIDUAL Group Therapy Note Date: 7/28/2020 Group Start Time: 1100 Group End Time: 1200 Group Topic: Topic Group [facility] [place] 3 ACUTE BEHAV [facility], [address] [facility] GROUP DOCUMENTATION GROUP Group Therapy Note Attendees: 5 Attendance: Did not attend Patient's Goal: Interventions/techniques: 
 
[place] Allegra

## 2020-07-29 LAB
GLUCOSE BLD STRIP.AUTO-MCNC: 190 MG/DL (ref 65–100)
GLUCOSE BLD STRIP.AUTO-MCNC: 214 MG/DL (ref 65–100)
GLUCOSE BLD STRIP.AUTO-MCNC: 94 MG/DL (ref 65–100)
GLUCOSE BLD STRIP.AUTO-MCNC: 95 MG/DL (ref 65–100)
GLUCOSE BLD STRIP.AUTO-MCNC: 98 MG/DL (ref 65–100)
SERVICE CMNT-IMP: ABNORMAL
SERVICE CMNT-IMP: ABNORMAL
SERVICE CMNT-IMP: NORMAL

## 2020-07-29 PROCEDURE — 65220000001 HC RM PRIVATE PSYCH

## 2020-07-29 PROCEDURE — 82962 GLUCOSE BLOOD TEST: CPT

## 2020-07-29 PROCEDURE — 74011250637 HC RX REV CODE- 250/637: Performed by: STUDENT IN AN ORGANIZED HEALTH CARE EDUCATION/TRAINING PROGRAM

## 2020-07-29 PROCEDURE — 74011250637 HC RX REV CODE- 250/637: Performed by: PSYCHIATRY & NEUROLOGY

## 2020-07-29 PROCEDURE — 74011636637 HC RX REV CODE- 636/637: Performed by: STUDENT IN AN ORGANIZED HEALTH CARE EDUCATION/TRAINING PROGRAM

## 2020-07-29 RX ORDER — LANOLIN ALCOHOL/MO/W.PET/CERES
6 CREAM (GRAM) TOPICAL
Status: DISCONTINUED | OUTPATIENT
Start: 2020-07-29 | End: 2020-07-31 | Stop reason: HOSPADM

## 2020-07-29 RX ORDER — THIOTHIXENE 2 MG/1
2 CAPSULE ORAL EVERY EVENING
Status: DISCONTINUED | OUTPATIENT
Start: 2020-07-29 | End: 2020-07-31 | Stop reason: HOSPADM

## 2020-07-29 RX ADMIN — LITHIUM CARBONATE 300 MG: 300 CAPSULE, GELATIN COATED ORAL at 16:34

## 2020-07-29 RX ADMIN — AMLODIPINE BESYLATE 5 MG: 5 TABLET ORAL at 10:50

## 2020-07-29 RX ADMIN — LITHIUM CARBONATE 150 MG: 150 CAPSULE, GELATIN COATED ORAL at 10:50

## 2020-07-29 RX ADMIN — METOPROLOL SUCCINATE 100 MG: 50 TABLET, EXTENDED RELEASE ORAL at 10:51

## 2020-07-29 RX ADMIN — THIOTHIXENE 2 MG: 2 CAPSULE ORAL at 16:34

## 2020-07-29 RX ADMIN — LATANOPROST 1 DROP: 50 SOLUTION OPHTHALMIC at 16:38

## 2020-07-29 RX ADMIN — MONTELUKAST SODIUM 10 MG: 10 TABLET, FILM COATED ORAL at 21:43

## 2020-07-29 RX ADMIN — INSULIN LISPRO 3 UNITS: 100 INJECTION, SOLUTION INTRAVENOUS; SUBCUTANEOUS at 16:33

## 2020-07-29 RX ADMIN — OLANZAPINE 2.5 MG: 2.5 TABLET, FILM COATED ORAL at 02:49

## 2020-07-29 RX ADMIN — PANTOPRAZOLE SODIUM 40 MG: 40 TABLET, DELAYED RELEASE ORAL at 06:26

## 2020-07-29 RX ADMIN — HUMAN INSULIN 20 UNITS: 100 INJECTION, SUSPENSION SUBCUTANEOUS at 16:33

## 2020-07-29 RX ADMIN — MELATONIN 6 MG: at 21:43

## 2020-07-29 NOTE — GROUP NOTE
ARNALDO  GROUP DOCUMENTATION INDIVIDUAL Group Therapy Note Date: 7/29/2020 Group Start Time: 1500 Group End Time: 8982 Group Topic: Recreational/Music Therapy [facility] [place] [address] ACUTE BEHAV [facility] [place], [address] GROUP DOCUMENTATION GROUP Group Therapy Note Attendees: 7 Attendance: Attended Patient's Goal:  To concentrate on selected task Interventions/techniques: Supported-crafts,games,music Follows Directions:  
 
Interactions: minimal 
 
Mental Status: Anxious Behavior/appearance: needed prompting Goals Achieved: Able to engage in interactions and Able to listen to others Additional Notes: preoccupied,short attention span,elected to listen to music-left session early [place] Allegra

## 2020-07-29 NOTE — GROUP NOTE
ARNALDO  GROUP DOCUMENTATION INDIVIDUAL Group Therapy Note Date: 7/29/2020 Group Start Time: 1100 Group End Time: 1200 Group Topic: Topic Group [facility] 3 ACUTE BEHAV [facility], [address] [facility] GROUP DOCUMENTATION GROUP Group Therapy Note Attendees: 7 Attendance: Did not attend Patient's Goal: Interventions/techniques: 
 
Luis F Griffin
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
## 2020-07-29 NOTE — BH NOTES
Assumed care of patient and given report by off going shift. Pt was out in the [employer], was social with peers and staff. She was in a much better mood tonight and was smiling and had no outbursts and was not seen responding to internal stimuli. She was compliant with medication tonight and had no prn's so far. She remains a bit intrusive with staff and peers but is redirectable. Pt did shout at her tonight feeling she was getting too close to her but pt backed off. Pt had  tonight, did not need coverage per orders. She did have evening snack. Pt was reported to have hit her head over the weekend and has a bump on her R side of her head, CT was normal.  She had no c/o of pain. She denied SI/HI and AVH as well as pain. She reports no anxiety/depression. Pt attended one group today. She has been meal compliant. Pt remains on 15 min safety checks. Will continue to monitor and treat. Pt got up arund 0200 and was out of room multiple times and was having AVH, paranoid, disorganized thoughts and talking about seeing eyeballs on the bottom shelf of her nightstand and said \"I covered it up with my washcloth because I don't trust it. \"  She had a washcloth on the bottom of her nightstand. She was given prn zyprexa and continues to pace in her room and out at the desk multiple times.

## 2020-07-29 NOTE — PROGRESS NOTES
Problem: Dominga/Hypomania  Goal: *LTG: Reduce agitation, impulsivity, and pressured speech  Description: Reduce agitation, impulsivity, and pressured speech while achieving sensitivity to the consequences of behavior and having more realistic expectations.   Outcome: Progressing Towards Goal  Goal: *STG: Be less agitated and distracted  Description: Be less agitated and distracted, e.g. be able to sit quietly and calmly for 30 minutes  Outcome: Progressing Towards Goal
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
## 2020-07-29 NOTE — PROGRESS NOTES
Problem: Dominga/Hypomania  Goal: *LTG: Reduce psychic energy and return to normal activity levels, good judgement, stable mood, and goal-directed behavior  Outcome: Progressing Towards Goal  Goal: *LTG: Reduce agitation, impulsivity, and pressured speech  Description: Reduce agitation, impulsivity, and pressured speech while achieving sensitivity to the consequences of behavior and having more realistic expectations. Outcome: Progressing Towards Goal  Goal: *LTG: Talk about underlying feelings of low self-esteem or guilt and fears of rejection, dependency, and abandonment  Outcome: Progressing Towards Goal  Goal: *LTG: Achieve controlled behavior, moderated  mood, and more deliberative speech and thought processes  Description: Achieve controlled behavior, moderated  mood, and more deliberative speech and thought processes through psychotherapy and medication. Outcome: Progressing Towards Goal  Goal: *STG: Sleep about 5 hours or more per night  Outcome: Progressing Towards Goal  Goal: *STG: Be less agitated and distracted  Description: Be less agitated and distracted, e.g. be able to sit quietly and calmly for 10 minutes  Outcome: Progressing Towards Goal  Goal: *STG: Decrease impulsivity in action  Description: Decrease impulsivity in action, e.g. refrain from engaging in self-destructive behaviors such as over-spending, promiscuity, substance abuse, or use of profane language. Outcome: Progressing Towards Goal  Goal: *STG: Identify fears associated with rejection, failure and abandonment  Outcome: Progressing Towards Goal  Goal: *STG: Acknowledge and explore causes for low self-esteem that is covered by grandiosity  Outcome: Progressing Towards Goal  Goal: *STG: Speak more slowly and be more subject-focused  Outcome: Progressing Towards Goal  Goal: *STG: Achieve mood stability  Description: Achieve mood stability, e.g. slower to react with anger and less expansive or elevated.   Outcome: Progressing Towards Goal  Goal: *STG: Pay attention to dressing and grooming appropriately  Outcome: Progressing Towards Goal  Goal: *STG: Comply with psychotropic medications as prescribed  Outcome: Progressing Towards Goal  Goal: *Patient Specific Goal (EDIT GOAL, INSERT TEXT)  Outcome: Progressing Towards Goal  Goal: *Patient Specific Goal (EDIT GOAL, INSERT TEXT)  Outcome: Progressing Towards Goal  Goal: Dominga/Hypomania Interventions  Outcome: Progressing Towards Goal     Problem: Dominga/Hypomania  Goal: *LTG: Reduce agitation, impulsivity, and pressured speech  Description: Reduce agitation, impulsivity, and pressured speech while achieving sensitivity to the consequences of behavior and having more realistic expectations. Outcome: Progressing Towards Goal     Problem: Dominga/Hypomania  Goal: *LTG: Talk about underlying feelings of low self-esteem or guilt and fears of rejection, dependency, and abandonment  Outcome: Progressing Towards Goal     Problem: Dominga/Hypomania  Goal: *LTG: Achieve controlled behavior, moderated  mood, and more deliberative speech and thought processes  Description: Achieve controlled behavior, moderated  mood, and more deliberative speech and thought processes through psychotherapy and medication.   Outcome: Progressing Towards Goal     Problem: Patient Education: Go to Patient Education Activity  Goal: Patient/Family Education  Outcome: Progressing Towards Goal Fall with Harm Risk

## 2020-07-29 NOTE — BH NOTES
0800 pt is visible on the unit. States she feels tired due to the medications she take. refused morning medications stating she wanted to speak with the MD during treatment team. Pt denies si/hi/a/v collins. Ate breakfast.       1000 pt spoke with MD. Pt took scheduled medications. But continued to refuse insulin. Pt educated about medication. Pt is visible on the unit attending groups. Quiet. 1200 ate lunch. Visible on the unit calm and cooperative. 1400 pt is visible on the unit quiet. Talking with staff. 1600 pt is visible on the unit. Attending groups. Calm and cooperative. 1800 pt is visible on the unit. Calm and cooperative. Ate dinner. Took scheduled medications.

## 2020-07-30 LAB
GLUCOSE BLD STRIP.AUTO-MCNC: 116 MG/DL (ref 65–100)
GLUCOSE BLD STRIP.AUTO-MCNC: 129 MG/DL (ref 65–100)
GLUCOSE BLD STRIP.AUTO-MCNC: 147 MG/DL (ref 65–100)
GLUCOSE BLD STRIP.AUTO-MCNC: 174 MG/DL (ref 65–100)
GLUCOSE BLD STRIP.AUTO-MCNC: 61 MG/DL (ref 65–100)
GLUCOSE BLD STRIP.AUTO-MCNC: 91 MG/DL (ref 65–100)
SERVICE CMNT-IMP: ABNORMAL
SERVICE CMNT-IMP: NORMAL

## 2020-07-30 PROCEDURE — 82962 GLUCOSE BLOOD TEST: CPT

## 2020-07-30 PROCEDURE — 74011250637 HC RX REV CODE- 250/637: Performed by: PSYCHIATRY & NEUROLOGY

## 2020-07-30 PROCEDURE — 74011250637 HC RX REV CODE- 250/637: Performed by: STUDENT IN AN ORGANIZED HEALTH CARE EDUCATION/TRAINING PROGRAM

## 2020-07-30 PROCEDURE — 65220000001 HC RM PRIVATE PSYCH

## 2020-07-30 PROCEDURE — 74011636637 HC RX REV CODE- 636/637: Performed by: STUDENT IN AN ORGANIZED HEALTH CARE EDUCATION/TRAINING PROGRAM

## 2020-07-30 RX ADMIN — INSULIN LISPRO 2 UNITS: 100 INJECTION, SOLUTION INTRAVENOUS; SUBCUTANEOUS at 08:04

## 2020-07-30 RX ADMIN — MELATONIN 6 MG: at 21:51

## 2020-07-30 RX ADMIN — LITHIUM CARBONATE 300 MG: 300 CAPSULE, GELATIN COATED ORAL at 16:26

## 2020-07-30 RX ADMIN — THIOTHIXENE 2 MG: 2 CAPSULE ORAL at 19:11

## 2020-07-30 RX ADMIN — MONTELUKAST SODIUM 10 MG: 10 TABLET, FILM COATED ORAL at 21:51

## 2020-07-30 RX ADMIN — HUMAN INSULIN 20 UNITS: 100 INJECTION, SUSPENSION SUBCUTANEOUS at 16:27

## 2020-07-30 RX ADMIN — HUMAN INSULIN 20 UNITS: 100 INJECTION, SUSPENSION SUBCUTANEOUS at 08:07

## 2020-07-30 RX ADMIN — PANTOPRAZOLE SODIUM 40 MG: 40 TABLET, DELAYED RELEASE ORAL at 05:44

## 2020-07-30 RX ADMIN — LITHIUM CARBONATE 150 MG: 150 CAPSULE, GELATIN COATED ORAL at 08:04

## 2020-07-30 RX ADMIN — INSULIN LISPRO 2 UNITS: 100 INJECTION, SOLUTION INTRAVENOUS; SUBCUTANEOUS at 16:27

## 2020-07-30 RX ADMIN — AMLODIPINE BESYLATE 5 MG: 5 TABLET ORAL at 08:03

## 2020-07-30 RX ADMIN — LATANOPROST 1 DROP: 50 SOLUTION OPHTHALMIC at 19:12

## 2020-07-30 RX ADMIN — METOPROLOL SUCCINATE 100 MG: 50 TABLET, EXTENDED RELEASE ORAL at 08:02

## 2020-07-30 NOTE — GROUP NOTE
ARNALDO  GROUP DOCUMENTATION INDIVIDUAL Group Therapy Note Date: 7/30/2020 Group Start Time: 1400 Group End Time: 1500 Group Topic: Recreational/Music Therapy [address] 3 ACUTE BEHAV [facility] [place], [address] GROUP DOCUMENTATION GROUP Group Therapy Note Attendees: 5 Attendance: Attended Patient's Goal:  To concentrate on selected task Interventions/techniques: Supported-crafts,games,music Follows Directions: Followed directions Interactions: Interacted appropriately Mental Status: Calm Behavior/appearance: Cooperative and Needed prompting Goals Achieved: Able to engage in interactions and Able to listen to others Additional Notes:  Pt left session early-reported not feeling well Taniya Dickey
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
## 2020-07-30 NOTE — GROUP NOTE
ARNALDO  GROUP DOCUMENTATION INDIVIDUAL Group Therapy Note Date: 7/30/2020 Group Start Time: 1100 Group End Time: 1200 Group Topic: Topic Group [facility] ACUTE BE[facility]AV [facility] [place], [address] GROUP DOCUMENTATION GROUP Group Therapy Note Attendees: 4 Attendance: Attended Patient's Goal:  To participate in mental health journey game Interventions/techniques: Supported-choices in recovery Follows Directions: Followed directions Interactions: Interacted appropriately Mental Status: Calm Behavior/appearance: Cooperative and Needed prompting Goals Achieved: Able to engage in interactions, Able to listen to others and Discussed coping Additional Notes:   
 
Arias Baldwin

## 2020-07-30 NOTE — BH NOTES
Patient is visible on the unit in the hallways ,dayroom and nursing station and is calm and cooperative at this time. Denies SI/HI/AVH   Slept 3 hours
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
## 2020-07-30 NOTE — INTERDISCIPLINARY ROUNDS
Behavioral Health Interdisciplinary Rounds Patient Name: Liat Becker  Age: 77 y.o. Room/Bed:  307/01 Primary Diagnosis: [facility] Admission Status: Involuntary Commitment Readmission within 30 days: no 
Power of  in place: no 
Patient requires a blocked bed: no           
Reason for blocked bed: na 
Order for blocked bed obtained: no    
 
Sleep hours: 3 Morning Labs completed per orders:  na      
Participation in Care/Groups:  yes Medication Compliant?: Yes PRNS (last 24 hours): None Restraints (last 24 hours):  no 
Substance Abuse:  no   
24 hour chart check complete: yes

## 2020-07-30 NOTE — BH NOTES
Behavioral Health Treatment Team Note     Patient goal(s) for today: Continue taking medications as prescribed, attend groups  Treatment team focus/goals: Adjust medications as needed, discharge planning    Progress note: Cherelle reports she is feeling much better and that she is ready for discharge tomorrow. She reports no issues with SI, HI, or hallucinations. She reports feeling clearer since adjusting her medications.     LOS:  9  Expected LOS: 10    Insurance info/prescription coverage:  [employer] Medicare  Date of last family contact:  [address] Jerome Diaz talked to Alicia sister 7/30  Family requesting physician contact today:  no  Discharge plan:  Home  Guns in the home:  no   Outpatient provider(s):  Dr Indai Agrawal    Participating treatment team members: Kushal Haney, Dr Debbie Cline MD

## 2020-07-31 VITALS
DIASTOLIC BLOOD PRESSURE: 87 MMHG | SYSTOLIC BLOOD PRESSURE: 114 MMHG | OXYGEN SATURATION: 97 % | RESPIRATION RATE: 17 BRPM | HEART RATE: 60 BPM | WEIGHT: 166 LBS | HEIGHT: 63 IN | TEMPERATURE: 97.3 F | BODY MASS INDEX: 29.41 KG/M2

## 2020-07-31 LAB
GLUCOSE BLD STRIP.AUTO-MCNC: 132 MG/DL (ref 65–100)
GLUCOSE BLD STRIP.AUTO-MCNC: 138 MG/DL (ref 65–100)
GLUCOSE BLD STRIP.AUTO-MCNC: 263 MG/DL (ref 65–100)
SERVICE CMNT-IMP: ABNORMAL

## 2020-07-31 PROCEDURE — 74011250637 HC RX REV CODE- 250/637: Performed by: PSYCHIATRY & NEUROLOGY

## 2020-07-31 PROCEDURE — 74011250637 HC RX REV CODE- 250/637: Performed by: STUDENT IN AN ORGANIZED HEALTH CARE EDUCATION/TRAINING PROGRAM

## 2020-07-31 PROCEDURE — 82962 GLUCOSE BLOOD TEST: CPT

## 2020-07-31 PROCEDURE — 74011636637 HC RX REV CODE- 636/637: Performed by: STUDENT IN AN ORGANIZED HEALTH CARE EDUCATION/TRAINING PROGRAM

## 2020-07-31 RX ORDER — METOPROLOL SUCCINATE 100 MG/1
100 TABLET, EXTENDED RELEASE ORAL DAILY
Qty: 30 TAB | Refills: 0 | Status: SHIPPED | OUTPATIENT
Start: 2020-07-31

## 2020-07-31 RX ORDER — LITHIUM CARBONATE 150 MG/1
150 CAPSULE ORAL
Qty: 30 CAP | Refills: 0 | Status: SHIPPED | OUTPATIENT
Start: 2020-07-31

## 2020-07-31 RX ORDER — LANOLIN ALCOHOL/MO/W.PET/CERES
6 CREAM (GRAM) TOPICAL
Qty: 30 TAB | Refills: 0 | Status: SHIPPED | OUTPATIENT
Start: 2020-07-31

## 2020-07-31 RX ORDER — THIOTHIXENE 2 MG/1
2 CAPSULE ORAL EVERY EVENING
Qty: 30 CAP | Refills: 0 | Status: SHIPPED | OUTPATIENT
Start: 2020-07-31

## 2020-07-31 RX ORDER — LITHIUM CARBONATE 300 MG/1
300 CAPSULE ORAL
Qty: 30 CAP | Refills: 0 | Status: SHIPPED | OUTPATIENT
Start: 2020-07-31

## 2020-07-31 RX ORDER — OLANZAPINE 5 MG/1
5 TABLET ORAL 2 TIMES DAILY
Qty: 60 TAB | Refills: 0 | Status: SHIPPED | OUTPATIENT
Start: 2020-07-31

## 2020-07-31 RX ORDER — AMLODIPINE BESYLATE 5 MG/1
5 TABLET ORAL DAILY
Qty: 30 TAB | Refills: 0 | Status: SHIPPED | OUTPATIENT
Start: 2020-07-31

## 2020-07-31 RX ADMIN — METOPROLOL SUCCINATE 100 MG: 50 TABLET, EXTENDED RELEASE ORAL at 08:49

## 2020-07-31 RX ADMIN — LITHIUM CARBONATE 150 MG: 150 CAPSULE, GELATIN COATED ORAL at 08:48

## 2020-07-31 RX ADMIN — HUMAN INSULIN 20 UNITS: 100 INJECTION, SUSPENSION SUBCUTANEOUS at 08:49

## 2020-07-31 RX ADMIN — OLANZAPINE 5 MG: 5 TABLET, FILM COATED ORAL at 11:48

## 2020-07-31 RX ADMIN — INSULIN LISPRO 5 UNITS: 100 INJECTION, SOLUTION INTRAVENOUS; SUBCUTANEOUS at 11:48

## 2020-07-31 RX ADMIN — PANTOPRAZOLE SODIUM 40 MG: 40 TABLET, DELAYED RELEASE ORAL at 05:30

## 2020-07-31 RX ADMIN — OLANZAPINE 2.5 MG: 2.5 TABLET, FILM COATED ORAL at 05:15

## 2020-07-31 RX ADMIN — AMLODIPINE BESYLATE 5 MG: 5 TABLET ORAL at 08:48

## 2020-07-31 NOTE — INTERDISCIPLINARY ROUNDS
Behavioral Health Interdisciplinary Rounds Patient Name: Dorsie Apgar Age: 77 y.o. Room/Bed:  307/01 Primary Diagnosis: [facility] disease, manic [facility] Admission Status: TDO Readmission within 30 days: No 
Power of  in place: No 
Patient requires a blocked bed: No           
Reason for blocked bed: N/A Order for blocked bed obtained: N/A Sleep hours: 4 Morning Labs completed per orders: N/A Participation in Care/Groups: Not in evening Medication Compliant?: Yes PRNS (last 24 hours): Tylenol & Trazadone @ 2143; Zyprexa 2.5mg PO @ 4180 Restraints (last 24 hours):  No  
Substance Abuse: No  
24 hour chart check complete:  Yes

## 2020-07-31 NOTE — BH NOTES
Received pt @ 1940 on 7/30 - sitting up in bed in room. A&O x4. Very calm and pleasant mood. Engaging in conversation w/ this writer. Denies SI/HI/AH/VH. Pt states her depression is better. Stated she had a really good day - ate all meals - complied with meds. Pt was smiling and laughing. 2220 - BS checked = 61 = gave pt OJ and encouraged her to finish her sandwich for snack that she was still eating and granola bar. 2344 - BS re-checked = 129.     0400 - pt awoke from sleep and yelled \"Good morning\" very loudly. Stayed in room awake for a while. Then began yelling, \"Help\" - extremely loud. 7354 - pt has continued yelling on and off for the past hour. Gave PO Zyprexa 2.5mg w/ pt compliance. Staff talked w/ pt to help de-escalate her agitation. Pt was receptive and talked w/ staff some and was able to calm self down some. Asked for BS to be checked and AM PO med to be given. Protonix was given at 0530 and BS was checked at 0526 = 138.     0639 - pt slept a total of 4 hrs last night. Was quiet and calm early in shift w/ decreased delusions/hallucinations. Upon awakening at 0400, pt had intermittent confusion and agitation and was somewhat redirectable.     1648 - pt felt her BS was low - asked for it to be checked. BS = 132. Pt stated her legs felt weak and this happens sometimes when her BS is low. Encouraged pt to lie down or sit down if legs feeling week. Pt went to room to lie down.

## 2020-07-31 NOTE — DISCHARGE SUMMARY
PSYCHIATRIC DISCHARGE SUMMARY         IDENTIFICATION:    Patient Name  Balbina Carballo   Date of Birth 1955   [facility] [id number]   Medical Record Number  [id number]      Age  77 y.o. PCP Deepak Arguelles MD   Admit date:  7/21/2020    Discharge date: 7/31/2020   Room Number  307/01  @ [facility]   Date of Service  7/31/2020            TYPE OF DISCHARGE: REGULAR               CONDITION AT DISCHARGE: improved       PROVISIONAL & DISCHARGE DIAGNOSES:    Problem List  Never Reviewed          Codes Class    * (Principal) Bipolar disease, manic ([facility] [address] ICD-10-CM: F31.10  ICD-9-CM: 296.40               Active Hospital Problems    *Bipolar disease, manic ([facility] [address]        DISCHARGE DIAGNOSIS:   Axis I:  SEE ABOVE  Axis II: SEE ABOVE  Axis III: SEE ABOVE  Axis IV:  lack of structure  Axis V:  <50 on admission, 55+ on discharge     CC & HISTORY OF PRESENT ILLNESS:   24-year-old [place] American female with history of bipolar disorder and multiple medical conditions who comes to the hospital in a manic state per report and was aggressive towards her sister and difficult to redirect, elevated, and irritable. Police had to be called at home because of her behavior. She was reporting seeing demons at one point. However, during our interview, she states that she has been compliant with her meds at home until she came to the hospital.  Blood sugars are not under still control and she had a couple of low spells in the last week and needed to regulate that better. She acknowledged that it might needed more regulation. No aggression, no violence during our interview. Stated that she is not suicidal or homicidal, and she is okay at this time. She does present labile to the staff and she was seeing demons in her room in the walls and she wanted the walls to be ripped down, but our nurse talked her down and she has been calm since.   She has been taking some of her medications now and she is here for management of her condition. SOCIAL HISTORY:    Social History     Socioeconomic History    Marital status: SINGLE     Spouse name: Not on file    Number of children: Not on file    Years of education: Not on file    Highest education level: Not on file   Occupational History    Not on file   Social Needs    Financial resource strain: Not on file    Food insecurity     Worry: Not on file     Inability: Not on file    Transportation needs     Medical: Not on file     Non-medical: Not on file   Tobacco Use    Smoking status: Former Smoker     Last attempt to quit:      Years since quittin.6    Smokeless tobacco: Never Used   Substance and Sexual Activity    Alcohol use: Never     Frequency: Never    Drug use: Never    Sexual activity: Not on file   Lifestyle    Physical activity     Days per week: Not on file     Minutes per session: Not on file    Stress: Not on file   Relationships    Social connections     Talks on phone: Not on file     Gets together: Not on file     Attends [employer] service: Not on file     Active member of club or organization: Not on file     Attends meetings of clubs or organizations: Not on file     Relationship status: Not on file    Intimate partner violence     Fear of current or ex partner: Not on file     Emotionally abused: Not on file     Physically abused: Not on file     Forced sexual activity: Not on file   Other Topics Concern    Not on file   Social History Narrative    Not on file      FAMILY HISTORY:   No family history on file. HOSPITALIZATION COURSE:    Augustine Davis was admitted to the inpatient psychiatric unit [facility] for acute psychiatric stabilization in regards to symptomatology as described in the HPI above. The differential diagnosis at time of admission included: schizophrenia vs substance induced psychotic disorder schizoaffective vs bipolar vs adjustment disorder.   While on the unit Augustine Davis was involved in individual, group, occupational and milieu therapy. Psychiatric medications were adjusted during this hospitalization. Belgica Hence demonstrated a progressive improvement in overall condition. Much of patient's initial presentation appeared to be related to situational stressors, effects of medication non-compliance and psychological factors. Please see individual progress notes for more specific details regarding patient's hospitalization course. Belgica Alexanedr reports feeling well and moods are good. Denies SI/HI/AH/VH. No aggression or violence. Appropriately interactive and aware. Tolerating medications well. Eating and sleeping fairly. Patient with request for discharge today. There are no grounds to seek a TDO. At time of discharge, Belgica Alexander is without significant problems of depression, psychosis, or dominga. Patient free of suicidal and homicidal ideations (appears to be at very low risk of suicide or homicide) and reports many positive predictive factors in terms of not attempting suicide or homicide. Overall presentation at time of discharge is most consistent with the diagnosis of Bipolar Dominga. Patient has maximized benefit to be derived from acute inpatient psychiatric treatment. All members of the treatment team concur with each other in regards to plans for discharge today. Patient and family are aware and in agreement with discharge and discharge plan.          LABS AND IMAGAING:    Labs Reviewed   HEMOGLOBIN A1C WITH EAG - Abnormal; Notable for the following components:       Result Value    Hemoglobin A1c 6.2 (*)     All other components within normal limits   LIPID PANEL - Abnormal; Notable for the following components:    Triglyceride 150 (*)     All other components within normal limits   METABOLIC PANEL, BASIC - Abnormal; Notable for the following components:    Sodium 146 (*)     Chloride 111 (*)     Glucose 150 (*)     Creatinine 1.43 (*)     GFR est AA 44 (*)     GFR est non-AA 37 (*)     All other components within normal limits   GLUCOSE, POC - Abnormal; Notable for the following components:    Glucose (POC) 51 (*)     All other components within normal limits   GLUCOSE, POC - Abnormal; Notable for the following components:    Glucose (POC) 41 (*)     All other components within normal limits   GLUCOSE, POC - Abnormal; Notable for the following components:    Glucose (POC) 156 (*)     All other components within normal limits   GLUCOSE, POC - Abnormal; Notable for the following components:    Glucose (POC) 235 (*)     All other components within normal limits   GLUCOSE, POC - Abnormal; Notable for the following components:    Glucose (POC) 208 (*)     All other components within normal limits   GLUCOSE, POC - Abnormal; Notable for the following components:    Glucose (POC) 107 (*)     All other components within normal limits   GLUCOSE, POC - Abnormal; Notable for the following components:    Glucose (POC) 225 (*)     All other components within normal limits   GLUCOSE, POC - Abnormal; Notable for the following components:    Glucose (POC) 129 (*)     All other components within normal limits   GLUCOSE, POC - Abnormal; Notable for the following components:    Glucose (POC) 194 (*)     All other components within normal limits   GLUCOSE, POC - Abnormal; Notable for the following components:    Glucose (POC) 240 (*)     All other components within normal limits   GLUCOSE, POC - Abnormal; Notable for the following components:    Glucose (POC) 237 (*)     All other components within normal limits   GLUCOSE, POC - Abnormal; Notable for the following components:    Glucose (POC) 115 (*)     All other components within normal limits   GLUCOSE, POC - Abnormal; Notable for the following components:    Glucose (POC) 140 (*)     All other components within normal limits   GLUCOSE, POC - Abnormal; Notable for the following components:    Glucose (POC) 156 (*)     All other components within normal limits   GLUCOSE, POC - Abnormal; Notable for the following components:    Glucose (POC) 147 (*)     All other components within normal limits   GLUCOSE, POC - Abnormal; Notable for the following components:    Glucose (POC) 56 (*)     All other components within normal limits   GLUCOSE, POC - Abnormal; Notable for the following components:    Glucose (POC) 57 (*)     All other components within normal limits   GLUCOSE, POC - Abnormal; Notable for the following components:    Glucose (POC) 103 (*)     All other components within normal limits   GLUCOSE, POC - Abnormal; Notable for the following components:    Glucose (POC) 136 (*)     All other components within normal limits   GLUCOSE, POC - Abnormal; Notable for the following components:    Glucose (POC) 109 (*)     All other components within normal limits   GLUCOSE, POC - Abnormal; Notable for the following components:    Glucose (POC) 111 (*)     All other components within normal limits   GLUCOSE, POC - Abnormal; Notable for the following components:    Glucose (POC) 280 (*)     All other components within normal limits   GLUCOSE, POC - Abnormal; Notable for the following components:    Glucose (POC) 53 (*)     All other components within normal limits   GLUCOSE, POC - Abnormal; Notable for the following components:    Glucose (POC) 54 (*)     All other components within normal limits   GLUCOSE, POC - Abnormal; Notable for the following components:    Glucose (POC) 152 (*)     All other components within normal limits   GLUCOSE, POC - Abnormal; Notable for the following components:    Glucose (POC) 186 (*)     All other components within normal limits   GLUCOSE, POC - Abnormal; Notable for the following components:    Glucose (POC) 189 (*)     All other components within normal limits   GLUCOSE, POC - Abnormal; Notable for the following components:    Glucose (POC) 152 (*)     All other components within normal limits   GLUCOSE, POC - Abnormal; Notable for the following components:    Glucose (POC) 143 (*)     All other components within normal limits   GLUCOSE, POC - Abnormal; Notable for the following components:    Glucose (POC) 194 (*)     All other components within normal limits   GLUCOSE, POC - Abnormal; Notable for the following components:    Glucose (POC) 167 (*)     All other components within normal limits   GLUCOSE, POC - Abnormal; Notable for the following components:    Glucose (POC) 143 (*)     All other components within normal limits   GLUCOSE, POC - Abnormal; Notable for the following components:    Glucose (POC) 130 (*)     All other components within normal limits   GLUCOSE, POC - Abnormal; Notable for the following components:    Glucose (POC) 143 (*)     All other components within normal limits   GLUCOSE, POC - Abnormal; Notable for the following components:    Glucose (POC) 103 (*)     All other components within normal limits   GLUCOSE, POC - Abnormal; Notable for the following components:    Glucose (POC) 153 (*)     All other components within normal limits   GLUCOSE, POC - Abnormal; Notable for the following components:    Glucose (POC) 214 (*)     All other components within normal limits   GLUCOSE, POC - Abnormal; Notable for the following components:    Glucose (POC) 190 (*)     All other components within normal limits   GLUCOSE, POC - Abnormal; Notable for the following components:    Glucose (POC) 174 (*)     All other components within normal limits   GLUCOSE, POC - Abnormal; Notable for the following components:    Glucose (POC) 116 (*)     All other components within normal limits   GLUCOSE, POC - Abnormal; Notable for the following components:    Glucose (POC) 147 (*)     All other components within normal limits   GLUCOSE, POC - Abnormal; Notable for the following components:    Glucose (POC) 61 (*)     All other components within normal limits   GLUCOSE, POC - Abnormal; Notable for the following components:    Glucose (POC) 129 (*)     All other components within normal limits   GLUCOSE, POC - Abnormal; Notable for the following components:    Glucose (POC) 138 (*)     All other components within normal limits   GLUCOSE, POC - Abnormal; Notable for the following components:    Glucose (POC) 132 (*)     All other components within normal limits   GLUCOSE, POC - Abnormal; Notable for the following components:    Glucose (POC) 263 (*)     All other components within normal limits   SARS-COV-2   CBC WITH AUTOMATED DIFF   MAGNESIUM   PHOSPHORUS   TSH 3RD GENERATION   LITHIUM   GLUCOSE, POC   GLUCOSE, POC   GLUCOSE, POC   GLUCOSE, POC   GLUCOSE, POC   GLUCOSE, POC   GLUCOSE, POC   GLUCOSE, POC   GLUCOSE, POC   GLUCOSE, POC   GLUCOSE, POC   GLUCOSE, POC   GLUCOSE, POC     No results found for: DS35, PHEN, PHENO, PHENT, DILF, DS39, PHENY, PTN, VALF2, VALAC, VALP, VALPR, DS6, CRBAM, CRBAMP, CARB2, XCRBAM  Admission on 07/21/2020   Component Date Value Ref Range Status    Glucose (POC) 07/21/2020 70  65 - 100 mg/dL Final    Performed by 07/21/2020 Emely Skiff   Final    Glucose (POC) 07/21/2020 51* 65 - 100 mg/dL Final    Performed by 07/21/2020 Emely Skiff   Final    Glucose (POC) 07/21/2020 41* 65 - 100 mg/dL Final    Performed by 07/21/2020 Emely Skiff   Final    Glucose (POC) 07/21/2020 70  65 - 100 mg/dL Final    Performed by 07/21/2020 Mealy Kanwal   Final    Glucose (POC) 07/21/2020 94  65 - 100 mg/dL Final    Performed by 07/21/2020 Mealy Kanwal   Final    Glucose (POC) 07/21/2020 156* 65 - 100 mg/dL Final    Performed by 07/21/2020 David Quill   Final    Glucose (POC) 07/22/2020 235* 65 - 100 mg/dL Final    Performed by 07/22/2020 Crow [place]   Final    Glucose (POC) 07/22/2020 208* 65 - 100 mg/dL Final    Performed by 07/22/2020 Stone [place]   Final    Glucose (POC) 07/22/2020 100  65 - 100 mg/dL Final    Performed by 07/22/2020 Tammi Ashton   Final    Glucose (POC) 07/22/2020 107* 65 - 100 mg/dL Final    Performed by 07/22/2020 Tahira Pappas   Final    Glucose (POC) 07/22/2020 225* 65 - 100 mg/dL Final    Performed by 07/22/2020 Odalis Haq   Final    Glucose (POC) 07/23/2020 129* 65 - 100 mg/dL Final    Performed by 07/23/2020 Lei Castellanos (RN)   Final    Glucose (POC) 07/23/2020 194* 65 - 100 mg/dL Final    Performed by 07/23/2020 Lei Castellanos (RN)   Final    Glucose (POC) 07/23/2020 240* 65 - 100 mg/dL Final    Performed by 07/23/2020 Posey Charlotte   Final    Specimen source 07/23/2020 Nasopharyngeal    Final    SARS-CoV-2 07/23/2020 Not detected  NOTD   Final    Specimen source 07/23/2020 Nasopharyngeal    Final    Glucose (POC) 07/23/2020 237* 65 - 100 mg/dL Final    Performed by 07/23/2020 Maya Segovia RN   Final    Glucose (POC) 07/24/2020 66  65 - 100 mg/dL Final    Performed by 07/24/2020 Dale Crane   Final    Glucose (POC) 07/24/2020 115* 65 - 100 mg/dL Final    Performed by 07/24/2020 Dale Crane   Final    Glucose (POC) 07/24/2020 140* 65 - 100 mg/dL Final    Performed by 07/24/2020 Dale Crane   Final    Glucose (POC) 07/24/2020 92  65 - 100 mg/dL Final    Performed by 07/24/2020 Alejandra Amador   Final    Glucose (POC) 07/24/2020 156* 65 - 100 mg/dL Final    Performed by 07/24/2020 Dale Crane   Final    Glucose (POC) 07/24/2020 96  65 - 100 mg/dL Final    Performed by 07/24/2020 Niko Elizalde RN   Final    WBC 07/25/2020 8.8  3.6 - 11.0 K/uL Final    RBC 07/25/2020 4.24  3.80 - 5.20 M/uL Final    HGB 07/25/2020 13.3  11.5 - 16.0 g/dL Final    HCT 07/25/2020 39.5  35.0 - 47.0 % Final    MCV 07/25/2020 93.2  80.0 - 99.0 FL Final    MCH 07/25/2020 31.4  26.0 - 34.0 PG Final    MCHC 07/25/2020 33.7  30.0 - 36.5 g/dL Final    RDW 07/25/2020 13.2  11.5 - 14.5 % Final    PLATELET 87/89/7996 270  150 - 400 K/uL Final    MPV 07/25/2020 9.4  8.9 - 12.9 FL Final    NRBC 07/25/2020 0.0  0  WBC Final    ABSOLUTE NRBC 07/25/2020 0.00 0.00 - 0.01 K/uL Final    NEUTROPHILS 07/25/2020 67  32 - 75 % Final    LYMPHOCYTES 07/25/2020 22  12 - 49 % Final    MONOCYTES 07/25/2020 7  5 - 13 % Final    EOSINOPHILS 07/25/2020 3  0 - 7 % Final    BASOPHILS 07/25/2020 1  0 - 1 % Final    IMMATURE GRANULOCYTES 07/25/2020 0  0.0 - 0.5 % Final    ABS. NEUTROPHILS 07/25/2020 5.9  1.8 - 8.0 K/UL Final    ABS. LYMPHOCYTES 07/25/2020 2.0  0.8 - 3.5 K/UL Final    ABS. MONOCYTES 07/25/2020 0.6  0.0 - 1.0 K/UL Final    ABS. EOSINOPHILS 07/25/2020 0.3  0.0 - 0.4 K/UL Final    ABS. BASOPHILS 07/25/2020 0.1  0.0 - 0.1 K/UL Final    ABS. IMM.  GRANS. 07/25/2020 0.0  0.00 - 0.04 K/UL Final    DF 07/25/2020 AUTOMATED    Final    Magnesium 07/25/2020 1.9  1.6 - 2.4 mg/dL Final    Phosphorus 07/25/2020 3.0  2.6 - 4.7 MG/DL Final    Glucose (POC) 07/25/2020 147* 65 - 100 mg/dL Final    Performed by 07/25/2020 Krystina Velasquez   Final    Glucose (POC) 07/25/2020 69  65 - 100 mg/dL Final    Performed by 07/25/2020 Krystina [place]   Final    Glucose (POC) 07/25/2020 56* 65 - 100 mg/dL Final    Performed by 07/25/2020 Krystina Ron   Final    Glucose (POC) 07/25/2020 57* 65 - 100 mg/dL Final    Performed by 07/25/2020 Tadeoroll Ron   Final    Glucose (POC) 07/25/2020 103* 65 - 100 mg/dL Final    Performed by 07/25/2020 Krystina [place]   Final    Glucose (POC) 07/25/2020 136* 65 - 100 mg/dL Final    Performed by 07/25/2020 Kumar Lawton RN   Final    Glucose (POC) 07/25/2020 109* 65 - 100 mg/dL Final    Performed by 07/25/2020 Deon Maier RN   Final    Glucose (POC) 07/26/2020 111* 65 - 100 mg/dL Final    Performed by 07/26/2020 Krystina Velasquez   Final    Glucose (POC) 07/26/2020 280* 65 - 100 mg/dL Final    Performed by 07/26/2020 Krystina Vealsquez   Final    Glucose (POC) 07/26/2020 53* 65 - 100 mg/dL Final    Performed by 07/26/2020 Krystina Velasquez   Final    Glucose (POC) 07/26/2020 54* 65 - 100 mg/dL Final    Performed by 07/26/2020 Krystina Velasquez Final    Glucose (POC) 07/26/2020 152* 65 - 100 mg/dL Final    Performed by 07/26/2020 Cris Pearce   Final    Glucose (POC) 07/26/2020 186* 65 - 100 mg/dL Final    Performed by 07/26/2020 Nilesh Georges RN   Final    Glucose (POC) 07/26/2020 189* 65 - 100 mg/dL Final    Performed by 07/26/2020 Nilesh Georges RN   Final    TSH 07/27/2020 0.39  0.36 - 3.74 uIU/mL Final    Lithium level 07/27/2020 0.74  0.60 - 1.20 MMOL/L Final    Reported dose date: 07/27/2020 NOT PROVIDED    Final    Reported dose time: 07/27/2020 NOT PROVIDED    Final    Reported dose: 07/27/2020 NOT PROVIDED  UNITS Final    Glucose (POC) 07/27/2020 152* 65 - 100 mg/dL Final    Performed by 07/27/2020 [place] Jessica   Final    Hemoglobin A1c 07/27/2020 6.2* 4.0 - 5.6 % Final    Est. average glucose 07/27/2020 131  mg/dL Final    LIPID PROFILE 07/27/2020        Final    Cholesterol, total 07/27/2020 189  <200 MG/DL Final    Triglyceride 07/27/2020 150* <150 MG/DL Final    HDL Cholesterol 07/27/2020 62  MG/DL Final    LDL, calculated 07/27/2020 97  0 - 100 MG/DL Final    VLDL, calculated 07/27/2020 30  MG/DL Final    CHOL/HDL Ratio 07/27/2020 3.0  0.0 - 5.0   Final    Sodium 07/27/2020 146* 136 - 145 mmol/L Final    Potassium 07/27/2020 4.2  3.5 - 5.1 mmol/L Final    Chloride 07/27/2020 111* 97 - 108 mmol/L Final    CO2 07/27/2020 28  21 - 32 mmol/L Final    Anion gap 07/27/2020 7  5 - 15 mmol/L Final    Glucose 07/27/2020 150* 65 - 100 mg/dL Final    BUN 07/27/2020 18  6 - 20 MG/DL Final    Creatinine 07/27/2020 1.43* 0.55 - 1.02 MG/DL Final    BUN/Creatinine ratio 07/27/2020 13  12 - 20   Final    GFR est AA 07/27/2020 44* >60 ml/min/1.73m2 Final    GFR est non-AA 07/27/2020 37* >60 ml/min/1.73m2 Final    Calcium 07/27/2020 9.5  8.5 - 10.1 MG/DL Final    Glucose (POC) 07/27/2020 143* 65 - 100 mg/dL Final    Performed by 07/27/2020 Marco A Davila LPN   Final    Glucose (POC) 07/27/2020 194* 65 - 100 mg/dL Final   Debora Diaz Performed by 07/27/2020 Ld Begun LPN   Final    Glucose (POC) 07/27/2020 84  65 - 100 mg/dL Final    Performed by 07/27/2020 Ld Begun LPN   Final    Glucose (POC) 07/27/2020 167* 65 - 100 mg/dL Final    Performed by 07/27/2020 Corena Shear   Final    Glucose (POC) 07/27/2020 143* 65 - 100 mg/dL Final    Performed by 07/27/2020 Corena Shear   Final    Glucose (POC) 07/28/2020 130* 65 - 100 mg/dL Final    Performed by 07/28/2020 Ester Bassett (SINDY)   Final    Glucose (POC) 07/28/2020 143* 65 - 100 mg/dL Final    Performed by 07/28/2020 Yahir Rodríguez   Final    Glucose (POC) 07/28/2020 103* 65 - 100 mg/dL Final    Performed by 07/28/2020 Ld Begun LPN   Final    Glucose (POC) 07/28/2020 153* 65 - 100 mg/dL Final    Performed by 07/28/2020 Brady Infante   Final    Glucose (POC) 07/29/2020 98  65 - 100 mg/dL Final    Performed by 07/29/2020 Mealy Kanwal   Final    Glucose (POC) 07/29/2020 95  65 - 100 mg/dL Final    Performed by 07/29/2020 Ld Begun LPN   Final    Glucose (POC) 07/29/2020 94  65 - 100 mg/dL Final    Performed by 07/29/2020 Mealy Kanwal   Final    Glucose (POC) 07/29/2020 214* 65 - 100 mg/dL Final    Performed by 07/29/2020 Mealy Kanwal   Final    Glucose (POC) 07/29/2020 190* 65 - 100 mg/dL Final    Performed by 07/29/2020 Sudarshan Fabian   Final    Glucose (POC) 07/30/2020 174* 65 - 100 mg/dL Final    Performed by 07/30/2020 Tonye Cower   Final    Glucose (POC) 07/30/2020 116* 65 - 100 mg/dL Final    Performed by 07/30/2020 Tonye Cower   Final    Glucose (POC) 07/30/2020 91  65 - 100 mg/dL Final    Performed by 07/30/2020 Tonye Cower   Final    Glucose (POC) 07/30/2020 147* 65 - 100 mg/dL Final    Performed by 07/30/2020 Tonye Cower   Final    Glucose (POC) 07/30/2020 61* 65 - 100 mg/dL Final    Performed by 07/30/2020 María Zayas    Glucose (POC) 07/30/2020 129* 65 - 100 mg/dL Final    Performed by 07/30/2020 María Zayas    Glucose (POC) 07/31/2020 138* 65 - 100 mg/dL Final    Performed by 07/31/2020 Kwame Brown   Final    Glucose (POC) 07/31/2020 132* 65 - 100 mg/dL Final    Performed by 07/31/2020 Kwame Brown   Final    Glucose (POC) 07/31/2020 263* 65 - 100 mg/dL Final    Performed by 07/31/2020 Ya Marker   Final   Admission on 07/20/2020, Discharged on 07/21/2020   Component Date Value Ref Range Status    WBC 07/21/2020 11.4* 3.6 - 11.0 K/uL Final    RBC 07/21/2020 4.33  3.80 - 5.20 M/uL Final    HGB 07/21/2020 13.3  11.5 - 16.0 g/dL Final    HCT 07/21/2020 40.0  35.0 - 47.0 % Final    MCV 07/21/2020 92.4  80.0 - 99.0 FL Final    MCH 07/21/2020 30.7  26.0 - 34.0 PG Final    MCHC 07/21/2020 33.3  30.0 - 36.5 g/dL Final    RDW 07/21/2020 13.0  11.5 - 14.5 % Final    PLATELET 42/14/2687 898  150 - 400 K/uL Final    MPV 07/21/2020 9.5  8.9 - 12.9 FL Final    NRBC 07/21/2020 0.0  0  WBC Final    ABSOLUTE NRBC 07/21/2020 0.00  0.00 - 0.01 K/uL Final    NEUTROPHILS 07/21/2020 73  32 - 75 % Final    LYMPHOCYTES 07/21/2020 18  12 - 49 % Final    MONOCYTES 07/21/2020 7  5 - 13 % Final    EOSINOPHILS 07/21/2020 2  0 - 7 % Final    BASOPHILS 07/21/2020 0  0 - 1 % Final    IMMATURE GRANULOCYTES 07/21/2020 0  0.0 - 0.5 % Final    ABS. NEUTROPHILS 07/21/2020 8.3* 1.8 - 8.0 K/UL Final    ABS. LYMPHOCYTES 07/21/2020 2.0  0.8 - 3.5 K/UL Final    ABS. MONOCYTES 07/21/2020 0.8  0.0 - 1.0 K/UL Final    ABS. EOSINOPHILS 07/21/2020 0.2  0.0 - 0.4 K/UL Final    ABS. BASOPHILS 07/21/2020 0.0  0.0 - 0.1 K/UL Final    ABS. IMM.  GRANS. 07/21/2020 0.0  0.00 - 0.04 K/UL Final    DF 07/21/2020 AUTOMATED    Final    Sodium 07/21/2020 141  136 - 145 mmol/L Final    Potassium 07/21/2020 3.1* 3.5 - 5.1 mmol/L Final    Chloride 07/21/2020 110* 97 - 108 mmol/L Final    CO2 07/21/2020 24  21 - 32 mmol/L Final    Anion gap 07/21/2020 7  5 - 15 mmol/L Final    Glucose 07/21/2020 105* 65 - 100 mg/dL Final    BUN 07/21/2020 21* 6 - 20 MG/DL Final    Creatinine 07/21/2020 1.54* 0.55 - 1.02 MG/DL Final    BUN/Creatinine ratio 07/21/2020 14  12 - 20   Final    GFR est AA 07/21/2020 41* >60 ml/min/1.73m2 Final    GFR est non-AA 07/21/2020 34* >60 ml/min/1.73m2 Final    Calcium 07/21/2020 9.8  8.5 - 10.1 MG/DL Final    Bilirubin, total 07/21/2020 0.4  0.2 - 1.0 MG/DL Final    ALT (SGPT) 07/21/2020 67  12 - 78 U/L Final    AST (SGOT) 07/21/2020 60* 15 - 37 U/L Final    Alk. phosphatase 07/21/2020 92  45 - 117 U/L Final    Protein, total 07/21/2020 7.2  6.4 - 8.2 g/dL Final    Albumin 07/21/2020 3.6  3.5 - 5.0 g/dL Final    Globulin 07/21/2020 3.6  2.0 - 4.0 g/dL Final    A-G Ratio 07/21/2020 1.0* 1.1 - 2.2   Final    Lithium level 07/21/2020 0.44* 0.60 - 1.20 MMOL/L Final    Reported dose date: 07/21/2020 NOT PROVIDED    Final    Reported dose time: 07/21/2020 NOT PROVIDED    Final    Reported dose: 07/21/2020 NOT PROVIDED  UNITS Final    ALCOHOL(ETHYL),SERUM 07/21/2020 <10  <10 MG/DL Final    AMPHETAMINES 07/21/2020 Negative  NEG   Final    BARBITURATES 07/21/2020 Negative  NEG   Final    BENZODIAZEPINES 07/21/2020 Negative  NEG   Final    COCAINE 07/21/2020 Negative  NEG   Final    METHADONE 07/21/2020 Negative  NEG   Final    OPIATES 07/21/2020 Negative  NEG   Final    PCP(PHENCYCLIDINE) 07/21/2020 Negative  NEG   Final    THC (TH-CANNABINOL) 07/21/2020 Negative  NEG   Final    Drug screen comment 07/21/2020 (NOTE)   Final    SAMPLES BEING HELD 07/21/2020 1BLU   Final    COMMENT 07/21/2020 Add-on orders for these samples will be processed based on acceptable specimen integrity and analyte stability, which may vary by analyte.     Final    Color 07/21/2020 YELLOW/STRAW    Final    Appearance 07/21/2020 CLEAR  CLEAR   Final    Specific gravity 07/21/2020 1.012  1.003 - 1.030   Final    pH (UA) 07/21/2020 5.0  5.0 - 8.0   Final    Protein 07/21/2020 Negative  NEG mg/dL Final    Glucose 07/21/2020 Negative  NEG mg/dL Final    Ketone 07/21/2020 Negative  NEG mg/dL Final    Bilirubin 07/21/2020 Negative  NEG   Final    Blood 07/21/2020 Negative  NEG   Final    Urobilinogen 07/21/2020 0.2  0.2 - 1.0 EU/dL Final    Nitrites 07/21/2020 Negative  NEG   Final    Leukocyte Esterase 07/21/2020 SMALL* NEG   Final    WBC 07/21/2020 0-4  0 - 4 /hpf Final    RBC 07/21/2020 0-5  0 - 5 /hpf Final    Epithelial cells 07/21/2020 FEW  FEW /lpf Final    Bacteria 07/21/2020 Negative  NEG /hpf Final    Urine culture hold 07/21/2020 Urine on hold in Microbiology dept for 2 days. If unpreserved urine is submitted, it cannot be used for addtional testing after 24 hours, recollection will be required. Final     Ct Head Wo Cont    Result Date: 7/26/2020  EXAM: CT HEAD WO CONT INDICATION: Patient fell COMPARISON: None. CONTRAST: None. TECHNIQUE: Unenhanced CT of the head was performed using 5 mm images. Brain and bone windows were generated. Coronal and sagittal reformats. CT dose reduction was achieved through use of a standardized protocol tailored for this examination and automatic exposure control for dose modulation. FINDINGS: The ventricles and sulci are enlarged. There is periventricular hypoattenuation compatible with chronic small vessel ischemic changes. There is no intracranial hemorrhage, extra-axial collection, or mass effect. The basilar cisterns are open. No CT evidence of acute infarct. The bone windows demonstrate no abnormalities. The visualized portions of the paranasal sinuses and mastoid air cells are clear. IMPRESSION: No acute findings. DISPOSITION:    Home. Patient to f/u with psychiatric, and psychotherapy appointments. Patient is to f/u with internist as directed.   Patient should have a lithium level and associated labs checked within the next 1-2 weeks by patient's o/p psychiatrist/internist.               FOLLOW-UP CARE:    Activity as tolerated  Regular diet  Wound Care: none needed. Follow-up Information     Follow up With Specialties Details Why Contact Info    Pla Barnhart MD Psychiatry Go on 8/7/2020 Your appointment is at [place] [address]  [phone]      [address]  [address]  [address] [facility]  [phone]                   PROGNOSIS:   Fair ---- based on nature of patient's pathology/ies and treatment compliance issues. Prognosis is greatly dependent upon patient's ability to remain sober and to follow up with scheduled appointments as well as to comply with psychiatric medications as prescribed. DISCHARGE MEDICATIONS:     Informed consent given for the use of following psychotropic medications:  Current Discharge Medication List      START taking these medications    Details   amLODIPine (NORVASC) 5 mg tablet Take 1 Tab by mouth daily. Indications: high blood pressure  Qty: 30 Tab, Refills: 0      melatonin 3 mg tablet Take 2 Tabs by mouth nightly. Indications: sleep  Qty: 30 Tab, Refills: 0      metoprolol succinate (TOPROL-XL) 100 mg tablet Take 1 Tab by mouth daily. Indications: high blood pressure  Qty: 30 Tab, Refills: 0      thiothixene (NAVANE) 2 mg capsule Take 1 Cap by mouth every evening. Indications: Bipolar disorder  Qty: 30 Cap, Refills: 0      OLANZapine (ZyPREXA) 5 mg tablet Take 1 Tab by mouth two (2) times a day. Indications: giles associated with bipolar disorder  Qty: 60 Tab, Refills: 0         CONTINUE these medications which have CHANGED    Details   !! lithium carbonate 300 mg capsule Take 1 Cap by mouth daily (with dinner). Indications: giles associated with bipolar disorder  Qty: 30 Cap, Refills: 0      !! lithium carbonate 150 mg capsule Take 1 Cap by mouth daily (with breakfast). Indications: giles associated with bipolar disorder  Qty: 30 Cap, Refills: 0       !! - Potential duplicate medications found. Please discuss with provider.       CONTINUE these medications which have NOT CHANGED    Details   mometasone (NASONEX) 50 mcg/actuation nasal spray 2 Sprays by Both Nostrils route daily as needed (allergies). omeprazole (PRILOSEC) 20 mg capsule Take 20 mg by mouth daily as needed (GERD). latanoprost (XALATAN) 0.005 % ophthalmic solution Administer 1 Drop to both eyes nightly. Indications: glaucoma      montelukast (Singulair) 10 mg tablet Take 10 mg by mouth nightly. Indications: seasonal runny nose         STOP taking these medications       hydroCHLOROthiazide (HYDRODIURIL) 12.5 mg tablet Comments:   Reason for Stopping:         insulin aspart protamine/insulin aspart (NovoLOG Mix 70-30 U-100 Insuln) 100 unit/mL (70-30) injection Comments:   Reason for Stopping:         insulin aspart protamine/insulin aspart (NovoLOG Mix 70-30 U-100 Insuln) 100 unit/mL (70-30) injection Comments:   Reason for Stopping:                      A coordinated, multidisplinary treatment team round was conducted with [place] is done daily here at [facility]. This team consists of the nurse, psychiatric unit pharmacist,  and Primo Sherwood. I have spent greater than 35 minutes on discharge work.     Signed:  Natanael Le MD  7/31/2020

## 2020-07-31 NOTE — PROGRESS NOTES
Problem: Dominga/Hypomania  Goal: *STG: Pay attention to dressing and grooming appropriately  Outcome: Progressing Towards Goal  Goal: *STG: Comply with psychotropic medications as prescribed  Outcome: Progressing Towards Goal     Problem: Dominga/Hypomania  Goal: *LTG: Reduce psychic energy and return to normal activity levels, good judgement, stable mood, and goal-directed behavior  Outcome: Not Progressing Towards Goal  Goal: *LTG: Reduce agitation, impulsivity, and pressured speech  Description: Reduce agitation, impulsivity, and pressured speech while achieving sensitivity to the consequences of behavior and having more realistic expectations.   Outcome: Not Progressing Towards Goal  Goal: *STG: Sleep about 5 hours or more per night  Outcome: Not Progressing Towards Goal

## 2020-07-31 NOTE — GROUP NOTE
ARNALDO  GROUP DOCUMENTATION INDIVIDUAL Group Therapy Note Date: 7/31/2020 Group Start Time: 1100 Group End Time: 1200 Group Topic: Topic Group [facility] [place] 3 ACUTE BEHAV [facility], [address] [facility] GROUP DOCUMENTATION GROUP Group Therapy Note Attendees: 5 Attendance: Did not attend Patient's Goal: Interventions/techniques: 
 
[place] Allegra

## 2020-07-31 NOTE — DISCHARGE INSTRUCTIONS
Patient Education        Bipolar Disorder: Care Instructions  Your Care Instructions     Bipolar disorder is an illness that causes extreme mood changes, from times of very high energy (manic episodes) to times of depression. But many people with bipolar disorder show only the symptoms of depression. These moods may cause problems with your work, school, family life, friendships, and how well you function. This disease is also called manic-depression. There is no cure for bipolar disorder, but it can be helped with medicines. Counseling may also help. It is important to take your medicines exactly as prescribed, even when you feel well. You may need lifelong treatment. Follow-up care is a key part of your treatment and safety. Be sure to make and go to all appointments, and call your doctor if you are having problems. It's also a good idea to know your test results and keep a list of the medicines you take. How can you care for yourself at home? · Be safe with medicines. Take your medicines exactly as prescribed. Do not stop or change a medicine without talking to your doctor first. Tommy Vale and your doctor may need to try different combinations of medicines to find what works for you. · Take your medicines on schedule to keep your moods even. When you feel good, you may think that you do not need your medicines. But it is important to keep taking them. · Go to your counseling sessions. Call and talk with your counselor if you can't go to a session or if you don't think the sessions are helping. Do not just stop going. · Get at least 30 minutes of activity on most days of the week. Walking is a good choice. You also may want to do other things, such as running, swimming, or cycling. · Get enough sleep. Keep your room dark and quiet. Try to go to bed at the same time every night. · Eat a healthy diet. This includes whole grains, dairy, fruits, vegetables, and protein. Eat foods from each of these groups.   · Try to lower your stress. Manage your time, build a strong support system, and lead a healthy lifestyle. To lower your stress, try physical activity, slow deep breathing, or getting a massage. · Do not use alcohol or illegal drugs. · Learn the early signs of your mood changes. You can then take steps to help yourself feel better. · Ask for help from friends and family when you need it. You may need help with daily chores when you are depressed. When you are manic, you may need support to control your high energy levels. What should you do if someone in your family has bipolar disorder? · Learn about the disease and the signs that it is getting worse. · Remind your family member that you love him or her. · Make a plan with all family members about how to take care of your loved one when his or her symptoms are bad. · Talk about your fears and concerns and those of other family members. Seek counseling if needed. · Do not focus attention only on the person who is in treatment. · Remind yourself that it will take time for changes to occur. · Do not blame yourself for the disease. · Know your legal rights and the legal rights of your family member. Support groups or counselors can help you with this information. · Take care of yourself. Keep up with your own interests, such as your career, hobbies, and friends. Use exercise, positive self-talk, deep breathing, and other relaxing exercises to help lower your stress. · Give yourself time to grieve. You may need to deal with emotions such as anger, fear, and frustration. After you work through your feelings, you will be better able to care for yourself and your family. · If you are having a hard time with your feelings or with your relationship with your family member, talk with a counselor. When should you call for help? [id number] anytime you think you may need emergency care. For example, call if:  · You feel like hurting yourself or someone else.   · Someone who has bipolar disorder displays dangerous behavior, and you think the person might hurt himself or herself or someone else. Call your doctor now or seek immediate medical care if:  · You hear voices. · Someone you know has bipolar disorder and talks about suicide. Keep the numbers for these national suicide hotlines: 8-707-587-TALK (4-337.893.6735) and 5-779-EBKPNYU (1-890.161.9497). If a suicide threat seems real, with a specific plan and a way to carry it out, stay with the person, or ask someone you trust to stay with the person, until you can get help. · Someone you know has bipolar disorder and:  ? Starts to give away possessions. ? Is using illegal drugs or drinking alcohol heavily. ? Talks or writes about death, including writing suicide notes or talking about guns, knives, or pills. ? Talks or writes about hurting someone else. ? Starts to spend a lot of time alone. ? Acts very aggressively or suddenly appears calm. ? Talks about beliefs that are not based in reality (delusions). Watch closely for changes in your health, and be sure to contact your doctor if:  · You cannot go to your counseling sessions. Where can you learn more? Go to http://[facility] Labsmaryse.info/  Enter K052 in the search box to learn more about \"Bipolar Disorder: Care Instructions. \"  Current as of: January 31, 2020               Content Version: 12.5  © 1540-9378 [employer]. Care instructions adapted under license by [employer] (which disclaims liability or warranty for this information). If you have questions about a medical condition or this instruction, always ask your healthcare professional. [place] [address] any warranty or liability for your use of this information. Erik Morocho .If I feel I am at risk of hurting myself or others, I will call the crisis office and speak with a crisis worker who will assist me during my crisis. [facility] [place] [address] [phone]  [facility]   [address]  [address] [facility]  [phone]  . ..gibran

## 2020-07-31 NOTE — BH NOTES
Behavioral Health Transition Record to Provider    Patient Name: Suad Cruz  YOB: 1955  Medical Record Number: [id number]  Date of Admission: 7/21/2020  Date of Discharge: 7/31/2020    Attending Provider: Laura Mendoza MD  Discharging Provider: Laura Mendoza MD  To contact this individual call [phone] and ask the  to page. If unavailable, ask to be transferred to [facility] Provider on call. [facility] Provider will be available on call 24/7 and during holidays. Primary Care Provider: Dana Piedra MD    Allergies   Allergen Reactions    Acetaminophen Palpitations    Codeine Unknown (comments)    Other Medication Unknown (comments)     States unable to tolerate oral diabetes medications    Penicillins Unknown (comments)    Statins-Hmg-Coa Reductase Inhibitors Other (comments)     Muscle cramps    Sulfa (Sulfonamide Antibiotics) Unknown (comments)       Reason for Admission: Under TDO for manic behavior and aggression toward sister    Admission Diagnosis: Bipolar disease, [facility] ([facility] [F31.10]    * No surgery found *    Results for orders placed or performed during the hospital encounter of 07/21/20   SARS-COV-2   Result Value Ref Range    Specimen source Nasopharyngeal      SARS-CoV-2 Not detected NOTD      Specimen source Nasopharyngeal     CBC WITH AUTOMATED DIFF   Result Value Ref Range    WBC 8.8 3.6 - 11.0 K/uL    RBC 4.24 3.80 - 5.20 M/uL    HGB 13.3 11.5 - 16.0 g/dL    HCT 39.5 35.0 - 47.0 %    MCV 93.2 80.0 - 99.0 FL    MCH 31.4 26.0 - 34.0 PG    MCHC 33.7 30.0 - 36.5 g/dL    RDW 13.2 11.5 - 14.5 %    PLATELET 033 847 - 903 K/uL    MPV 9.4 8.9 - 12.9 FL    NRBC 0.0 0  WBC    ABSOLUTE NRBC 0.00 0.00 - 0.01 K/uL    NEUTROPHILS 67 32 - 75 %    LYMPHOCYTES 22 12 - 49 %    MONOCYTES 7 5 - 13 %    EOSINOPHILS 3 0 - 7 %    BASOPHILS 1 0 - 1 %    IMMATURE GRANULOCYTES 0 0.0 - 0.5 %    ABS. NEUTROPHILS 5.9 1.8 - 8.0 K/UL    ABS. LYMPHOCYTES 2.0 0.8 - 3.5 K/UL    ABS. MONOCYTES 0.6 0.0 - 1.0 K/UL    ABS. EOSINOPHILS 0.3 0.0 - 0.4 K/UL    ABS. BASOPHILS 0.1 0.0 - 0.1 K/UL    ABS. IMM.  GRANS. 0.0 0.00 - 0.04 K/UL    DF AUTOMATED     MAGNESIUM   Result Value Ref Range    Magnesium 1.9 1.6 - 2.4 mg/dL   PHOSPHORUS   Result Value Ref Range    Phosphorus 3.0 2.6 - 4.7 MG/DL   TSH 3RD GENERATION   Result Value Ref Range    TSH 0.39 0.36 - 3.74 uIU/mL   LITHIUM   Result Value Ref Range    Lithium level 0.74 0.60 - 1.20 MMOL/L    Reported dose date: NOT PROVIDED      Reported dose time: NOT PROVIDED      Reported dose: NOT PROVIDED UNITS   HEMOGLOBIN A1C WITH EAG   Result Value Ref Range    Hemoglobin A1c 6.2 (H) 4.0 - 5.6 %    Est. average glucose 131 mg/dL   LIPID PANEL   Result Value Ref Range    LIPID PROFILE          Cholesterol, total 189 <200 MG/DL    Triglyceride 150 (H) <150 MG/DL    HDL Cholesterol 62 MG/DL    LDL, calculated 97 0 - 100 MG/DL    VLDL, calculated 30 MG/DL    CHOL/HDL Ratio 3.0 0.0 - 5.0     METABOLIC PANEL, BASIC   Result Value Ref Range    Sodium 146 (H) 136 - 145 mmol/L    Potassium 4.2 3.5 - 5.1 mmol/L    Chloride 111 (H) 97 - 108 mmol/L    CO2 28 21 - 32 mmol/L    Anion gap 7 5 - 15 mmol/L    Glucose 150 (H) 65 - 100 mg/dL    BUN 18 6 - 20 MG/DL    Creatinine 1.43 (H) 0.55 - 1.02 MG/DL    BUN/Creatinine ratio 13 12 - 20      GFR est AA 44 (L) >60 ml/min/1.73m2    GFR est non-AA 37 (L) >60 ml/min/1.73m2    Calcium 9.5 8.5 - 10.1 MG/DL   GLUCOSE, POC   Result Value Ref Range    Glucose (POC) 70 65 - 100 mg/dL    Performed by [employer]    GLUCOSE, POC   Result Value Ref Range    Glucose (POC) 51 (L) 65 - 100 mg/dL    Performed by [employer]    GLUCOSE, POC   Result Value Ref Range    Glucose (POC) 41 (LL) 65 - 100 mg/dL    Performed by Sy Muse    GLUCOSE, POC   Result Value Ref Range    Glucose (POC) 70 65 - 100 mg/dL    Performed by Parul Boyd    GLUCOSE, POC   Result Value Ref Range    Glucose (POC) 94 65 - 100 mg/dL    Performed by [address] [place] [address] Se, POC   Result Value Ref Range    Glucose (POC) 156 (H) 65 - 100 mg/dL    Performed by [address] [place] [address], POC   Result Value Ref Range    Glucose (POC) 235 (H) 65 - 100 mg/dL    Performed by [address], POC   Result Value Ref Range    Glucose (POC) 208 (H) 65 - 100 mg/dL    Performed by Michelle [address], POC   Result Value Ref Range    Glucose (POC) 100 65 - 100 mg/dL    Performed by [employer]    GLUCOSE, POC   Result Value Ref Range    Glucose (POC) 107 (H) 65 - 100 mg/dL    Performed by Yan Felipe    GLUCOSE, POC   Result Value Ref Range    Glucose (POC) 225 (H) 65 - 100 mg/dL    Performed by Michael Springer    GLUCOSE, POC   Result Value Ref Range    Glucose (POC) 129 (H) 65 - 100 mg/dL    Performed by Jeannie Olivia (SINDY)    GLUCOSE, POC   Result Value Ref Range    Glucose (POC) 194 (H) 65 - 100 mg/dL    Performed by Jeannie Olivia (SINDY)    GLUCOSE, POC   Result Value Ref Range    Glucose (POC) 240 (H) 65 - 100 mg/dL    Performed by Yesica Womack    GLUCOSE, POC   Result Value Ref Range    Glucose (POC) 237 (H) 65 - 100 mg/dL    Performed by Toi Villarreal RN    GLUCOSE, POC   Result Value Ref Range    Glucose (POC) 66 65 - 100 mg/dL    Performed by Hussein Fuelling    GLUCOSE, POC   Result Value Ref Range    Glucose (POC) 115 (H) 65 - 100 mg/dL    Performed by Hussein Fuelling    GLUCOSE, POC   Result Value Ref Range    Glucose (POC) 140 (H) 65 - 100 mg/dL    Performed by Hussein Fuelling    GLUCOSE, POC   Result Value Ref Range    Glucose (POC) 92 65 - 100 mg/dL    Performed by [employer]    GLUCOSE, POC   Result Value Ref Range    Glucose (POC) 156 (H) 65 - 100 mg/dL    Performed by Hussein Fuelling    GLUCOSE, POC   Result Value Ref Range    Glucose (POC) 96 65 - 100 mg/dL    Performed by Mima Dominguez RN    GLUCOSE, POC   Result Value Ref Range    Glucose (POC) 147 (H) 65 - 100 mg/dL    Performed by Hussein Fuelling    GLUCOSE, POC   Result Value Ref Range    Glucose (POC) 69 65 - 100 mg/dL    Performed by [facility]    GLUCOSE, POC   Result Value Ref Range    Glucose (POC) 56 (L) 65 - 100 mg/dL    Performed by [facility]    GLUCOSE, POC   Result Value Ref Range    Glucose (POC) 57 (L) 65 - 100 mg/dL    Performed by Michael Cherry, POC   Result Value Ref Range    Glucose (POC) 103 (H) 65 - 100 mg/dL    Performed by Michael Cherry, POC   Result Value Ref Range    Glucose (POC) 136 (H) 65 - 100 mg/dL    Performed by Uri Robles RN    GLUCOSE, POC   Result Value Ref Range    Glucose (POC) 109 (H) 65 - 100 mg/dL    Performed by Mandeep Ibrahim RN    GLUCOSE, POC   Result Value Ref Range    Glucose (POC) 111 (H) 65 - 100 mg/dL    Performed by [facility]    GLUCOSE, POC   Result Value Ref Range    Glucose (POC) 280 (H) 65 - 100 mg/dL    Performed by [facility]    GLUCOSE, POC   Result Value Ref Range    Glucose (POC) 53 (L) 65 - 100 mg/dL    Performed by [facility]    GLUCOSE, POC   Result Value Ref Range    Glucose (POC) 54 (L) 65 - 100 mg/dL    Performed by [facility]    GLUCOSE, POC   Result Value Ref Range    Glucose (POC) 152 (H) 65 - 100 mg/dL    Performed by [facility]    GLUCOSE, POC   Result Value Ref Range    Glucose (POC) 186 (H) 65 - 100 mg/dL    Performed by Terry Councilman RN    GLUCOSE, POC   Result Value Ref Range    Glucose (POC) 189 (H) 65 - 100 mg/dL    Performed by Terry Councilman RN    GLUCOSE, POC   Result Value Ref Range    Glucose (POC) 152 (H) 65 - 100 mg/dL    Performed by Bk Ruelas    GLUCOSE, POC   Result Value Ref Range    Glucose (POC) 143 (H) 65 - 100 mg/dL    Performed by Sammie Roe LPN    GLUCOSE, POC   Result Value Ref Range    Glucose (POC) 194 (H) 65 - 100 mg/dL    Performed by Sammie Roe LPN    GLUCOSE, POC   Result Value Ref Range    Glucose (POC) 84 65 - 100 mg/dL    Performed by Sammie Roe LPN    GLUCOSE, POC   Result Value Ref Range    Glucose (POC) 167 (H) 65 - 100 mg/dL    Performed by Tahira Pappas    GLUCOSE, POC   Result Value Ref Range    Glucose (POC) 143 (H) 65 - 100 mg/dL    Performed by Tahira Pappas    GLUCOSE, POC   Result Value Ref Range    Glucose (POC) 130 (H) 65 - 100 mg/dL    Performed by Lei ASHTON)    GLUCOSE, POC   Result Value Ref Range    Glucose (POC) 143 (H) 65 - 100 mg/dL    Performed by Timbo Fatou    GLUCOSE, POC   Result Value Ref Range    Glucose (POC) 103 (H) 65 - 100 mg/dL    Performed by Fabio Boxee LPN    GLUCOSE, POC   Result Value Ref Range    Glucose (POC) 153 (H) 65 - 100 mg/dL    Performed by Odalis Haq    GLUCOSE, POC   Result Value Ref Range    Glucose (POC) 98 65 - 100 mg/dL    Performed by Carlotta Rahman    GLUCOSE, POC   Result Value Ref Range    Glucose (POC) 95 65 - 100 mg/dL    Performed by Fabio Martínez LPABHISHEK    GLUCOSE, POC   Result Value Ref Range    Glucose (POC) 94 65 - 100 mg/dL    Performed by Carlotta Rahman    GLUCOSE, POC   Result Value Ref Range    Glucose (POC) 214 (H) 65 - 100 mg/dL    Performed by Carlotta Rahman    GLUCOSE, POC   Result Value Ref Range    Glucose (POC) 190 (H) 65 - 100 mg/dL    Performed by Izzy Shaikh    GLUCOSE, POC   Result Value Ref Range    Glucose (POC) 174 (H) 65 - 100 mg/dL    Performed by Lydia Douglas    GLUCOSE, POC   Result Value Ref Range    Glucose (POC) 116 (H) 65 - 100 mg/dL    Performed by Lydia Douglas    GLUCOSE, POC   Result Value Ref Range    Glucose (POC) 91 65 - 100 mg/dL    Performed by Lydia Douglas    GLUCOSE, POC   Result Value Ref Range    Glucose (POC) 147 (H) 65 - 100 mg/dL    Performed by Lydia Douglas    GLUCOSE, POC   Result Value Ref Range    Glucose (POC) 61 (L) 65 - 100 mg/dL    Performed by Destiny Hu    GLUCOSE, POC   Result Value Ref Range    Glucose (POC) 129 (H) 65 - 100 mg/dL    Performed by Destiny Hu    GLUCOSE, POC   Result Value Ref Range    Glucose (POC) 138 (H) 65 - 100 mg/dL    Performed by Destiny Hu    GLUCOSE, POC   Result Value Ref Range Glucose (POC) 132 (H) 65 - 100 mg/dL    Performed by [address] [place] [address], POC   Result Value Ref Range    Glucose (POC) 263 (H) 65 - 100 mg/dL    Performed by [facility]        Immunizations administered during this encounter: There is no immunization history on file for this patient. Screening for Metabolic Disorders for Patients on Antipsychotic Medications  (Data obtained from the EMR)    Estimated Body Mass Index  Estimated body mass index is 29.41 kg/m² as calculated from the following:    Height as of this encounter: 5' 3\" (1.6 m). Weight as of this encounter: 75.3 kg (166 lb). Vital Signs/Blood Pressure  Visit Vitals  /87 (BP 1 Location: Right arm, BP Patient Position: At rest;Sitting)   Pulse 60   Temp 97.3 °F (36.3 °C)   Resp 17   Ht 5' 3\" (1.6 m)   Wt 75.3 kg (166 lb)   SpO2 97%   BMI 29.41 kg/m²       Blood Glucose/Hemoglobin A1c  Lab Results   Component Value Date/Time    Glucose 150 (H) 2020 05:03 AM    Glucose (POC) 263 (H) 2020 11:35 AM       Lab Results   Component Value Date/Time    Hemoglobin A1c 6.2 (H) 2020 06:13 AM        Lipid Panel  Lab Results   Component Value Date/Time    Cholesterol, total 189 2020 06:13 AM    HDL Cholesterol 62 2020 06:13 AM    LDL, calculated 97 2020 06:13 AM    Triglyceride 150 (H) 2020 06:13 AM    CHOL/HDL Ratio 3.0 2020 06:13 AM        Discharge Diagnosis: Bipolar Disorder    Discharge Plan: Home with sister    Anais Chaparro  : 1955  MRN: [id number]    The patient Balbina Carballo exhibits the ability to control behavior in a less restrictive environment. Patient's level of functioning is improving. No assaultive/destructive behavior has been observed for the past 24 hours. No suicidal/homicidal threat or behavior has been observed for the past 24 hours. There is no evidence of serious medication side effects.   Patient has not been in physical or protective restraints for at least the past 24 hours. If weapons involved, how are they secured? N/A    Is patient aware of and in agreement with discharge plan? Yes    Arrangements for medication:  Prescriptions given to patient, given a weeks supply or 30 day supply. Copy of discharge instructions to provider?:  Yes, Dr Eulalia Barrera for transportation home:  Sister to     Keep all follow up appointments as scheduled, continue to take prescribed medications per physician instructions. Mental health crisis number:  102 or your local mental health crisis line number at [phone]          Discharge Medication List and Instructions:   Current Discharge Medication List      START taking these medications    Details   amLODIPine (NORVASC) 5 mg tablet Take 1 Tab by mouth daily. Indications: high blood pressure  Qty: 30 Tab, Refills: 0      melatonin 3 mg tablet Take 2 Tabs by mouth nightly. Indications: sleep  Qty: 30 Tab, Refills: 0      metoprolol succinate (TOPROL-XL) 100 mg tablet Take 1 Tab by mouth daily. Indications: high blood pressure  Qty: 30 Tab, Refills: 0      thiothixene (NAVANE) 2 mg capsule Take 1 Cap by mouth every evening. Indications: Bipolar disorder  Qty: 30 Cap, Refills: 0      OLANZapine (ZyPREXA) 5 mg tablet Take 1 Tab by mouth two (2) times a day. Indications: giles associated with bipolar disorder  Qty: 60 Tab, Refills: 0         CONTINUE these medications which have CHANGED    Details   !! lithium carbonate 300 mg capsule Take 1 Cap by mouth daily (with dinner). Indications: giles associated with bipolar disorder  Qty: 30 Cap, Refills: 0      !! lithium carbonate 150 mg capsule Take 1 Cap by mouth daily (with breakfast). Indications: giles associated with bipolar disorder  Qty: 30 Cap, Refills: 0       !! - Potential duplicate medications found. Please discuss with provider.       CONTINUE these medications which have NOT CHANGED    Details   mometasone (NASONEX) 50 mcg/actuation nasal spray 2 Sprays by Both Nostrils route daily as needed (allergies). omeprazole (PRILOSEC) 20 mg capsule Take 20 mg by mouth daily as needed (GERD). latanoprost (XALATAN) 0.005 % ophthalmic solution Administer 1 Drop to both eyes nightly. Indications: glaucoma      montelukast (Singulair) 10 mg tablet Take 10 mg by mouth nightly. Indications: seasonal runny nose         STOP taking these medications       hydroCHLOROthiazide (HYDRODIURIL) 12.5 mg tablet Comments:   Reason for Stopping:         insulin aspart protamine/insulin aspart (NovoLOG Mix 70-30 U-100 Insuln) 100 unit/mL (70-30) injection Comments:   Reason for Stopping:         insulin aspart protamine/insulin aspart (NovoLOG Mix 70-30 U-100 Insuln) 100 unit/mL (70-30) injection Comments:   Reason for Stopping:               Unresulted Labs (24h ago, onward)    None        To obtain results of studies pending at discharge, please contact [phone]    Follow-up Information     Follow up With Specialties Details Why Contact Info    Tye Cohen MD Psychiatry Go on 8/7/2020 Your appointment is at [place] [address]  [phone]      Levine, Susan. \Hospital Has a New Name and Outlook.\"" 100  [address] [facility]  [phone]            Advanced Directive:   Does the patient have an appointed surrogate decision maker? No  Does the patient have a Medical Advance Directive? No  Does the patient have a Psychiatric Advance Directive? No  If the patient does not have a surrogate or Medical Advance Directive AND Psychiatric Advance Directive, the patient was offered information on these advance directives Patient will complete at a later time    Patient Instructions: Please continue all medications until otherwise directed by physician. Tobacco Cessation Discharge Plan:   Is the patient a smoker and needs referral for smoking cessation?  No  Patient referred to the following for smoking cessation with an appointment? No     Patient was offered medication to assist with smoking cessation at discharge? No  Was education for smoking cessation added to the discharge instructions? No    Alcohol/Substance Abuse Discharge Plan:   Does the patient have a history of substance/alcohol abuse and requires a referral for treatment? No  Patient referred to the following for substance/alcohol abuse treatment with an appointment? No  Patient was offered medication to assist with alcohol cessation at discharge? No  Was education for substance/alcohol abuse added to discharge instructions? No    Patient discharged to Home; discussed with patient/caregiver and provided to the patient/caregiver either in hard copy or electronically.

## 2020-07-31 NOTE — PROGRESS NOTES
Problem: Dominga/Hypomania  Goal: *LTG: Reduce agitation, impulsivity, and pressured speech  Description: Reduce agitation, impulsivity, and pressured speech while achieving sensitivity to the consequences of behavior and having more realistic expectations. Outcome: Progressing Towards Goal     Problem: Dominga/Hypomania  Goal: *LTG: Achieve controlled behavior, moderated  mood, and more deliberative speech and thought processes  Description: Achieve controlled behavior, moderated  mood, and more deliberative speech and thought processes through psychotherapy and medication.   Outcome: Progressing Towards Goal     Problem: Dominga/Hypomania  Goal: *STG: Sleep about 5 hours or more per night  Outcome: Progressing Towards Goal

## 2020-07-31 NOTE — BH NOTES
0700  Received report from Nilson Diaz, RN. Assumed care of the patient. Pt in day room upon assessment. Pt immediately requesting her BG being rechecked because she feels her \"sugars dropping\". This writer explained she hasn't even received insulin yet and she just ate breakfast.  Pt seems to have delusions about her BG levels and frequently requests that nursing checks it throughout the day. This writer offered her juice at this time and told her we could recheck it later. Pt states her mood is pretty good this morning and denies anxiety/depression/AVH/SI/HI/pain. Last bm was yesterday. Night shift checked her BG at 0709 and it was 132 so Humalog was held per protocol.     0800  Pt in day room for breakfast     0849  Pt compliant with am medication

## 2020-07-31 NOTE — BH NOTES
Pt has been becoming agitated; calling out good morning loudly and repeatedly. She states she is very nervous and Needs her medicine. Given Zyprexa 2.5mgpo as PRN dose for agitation. PPt given PO fluids and reassurance that she would be okay.

## 2020-08-02 ENCOUNTER — HOSPITAL ENCOUNTER (EMERGENCY)
Age: 65
Discharge: HOME | End: 2020-08-03
Attending: EMERGENCY MEDICINE | Admitting: EMERGENCY MEDICINE
Payer: MEDICARE

## 2020-08-02 DIAGNOSIS — F31.9 BIPOLAR 1 DISORDER (HCC): Primary | ICD-10-CM

## 2020-08-02 LAB
APPEARANCE UR: CLEAR
BACTERIA URNS QL MICRO: NEGATIVE /HPF
BASOPHILS # BLD: 0.1 K/UL (ref 0–0.1)
BASOPHILS NFR BLD: 1 % (ref 0–1)
BILIRUB UR QL: NEGATIVE
COLOR UR: ABNORMAL
COMMENT, HOLDF: NORMAL
DIFFERENTIAL METHOD BLD: ABNORMAL
EOSINOPHIL # BLD: 0.3 K/UL (ref 0–0.4)
EOSINOPHIL NFR BLD: 3 % (ref 0–7)
EPITH CASTS URNS QL MICRO: ABNORMAL /LPF
ERYTHROCYTE [DISTWIDTH] IN BLOOD BY AUTOMATED COUNT: 12.8 % (ref 11.5–14.5)
GLUCOSE UR STRIP.AUTO-MCNC: NEGATIVE MG/DL
HCT VFR BLD AUTO: 39.2 % (ref 35–47)
HGB BLD-MCNC: 12.7 G/DL (ref 11.5–16)
HGB UR QL STRIP: NEGATIVE
HYALINE CASTS URNS QL MICRO: ABNORMAL /LPF (ref 0–5)
IMM GRANULOCYTES # BLD AUTO: 0.1 K/UL (ref 0–0.04)
IMM GRANULOCYTES NFR BLD AUTO: 1 % (ref 0–0.5)
KETONES UR QL STRIP.AUTO: NEGATIVE MG/DL
LEUKOCYTE ESTERASE UR QL STRIP.AUTO: ABNORMAL
LYMPHOCYTES # BLD: 2.5 K/UL (ref 0.8–3.5)
LYMPHOCYTES NFR BLD: 24 % (ref 12–49)
MCH RBC QN AUTO: 31.1 PG (ref 26–34)
MCHC RBC AUTO-ENTMCNC: 32.4 G/DL (ref 30–36.5)
MCV RBC AUTO: 96.1 FL (ref 80–99)
MONOCYTES # BLD: 0.7 K/UL (ref 0–1)
MONOCYTES NFR BLD: 7 % (ref 5–13)
NEUTS SEG # BLD: 7.1 K/UL (ref 1.8–8)
NEUTS SEG NFR BLD: 64 % (ref 32–75)
NITRITE UR QL STRIP.AUTO: NEGATIVE
NRBC # BLD: 0 K/UL (ref 0–0.01)
NRBC BLD-RTO: 0 PER 100 WBC
PH UR STRIP: 6 [PH] (ref 5–8)
PLATELET # BLD AUTO: 262 K/UL (ref 150–400)
PMV BLD AUTO: 9.9 FL (ref 8.9–12.9)
PROT UR STRIP-MCNC: NEGATIVE MG/DL
RBC # BLD AUTO: 4.08 M/UL (ref 3.8–5.2)
RBC #/AREA URNS HPF: ABNORMAL /HPF (ref 0–5)
SAMPLES BEING HELD,HOLD: NORMAL
SP GR UR REFRACTOMETRY: 1.01 (ref 1–1.03)
UR CULT HOLD, URHOLD: NORMAL
UROBILINOGEN UR QL STRIP.AUTO: 0.2 EU/DL (ref 0.2–1)
WBC # BLD AUTO: 10.7 K/UL (ref 3.6–11)
WBC URNS QL MICRO: ABNORMAL /HPF (ref 0–4)

## 2020-08-02 PROCEDURE — 80307 DRUG TEST PRSMV CHEM ANLYZR: CPT

## 2020-08-02 PROCEDURE — 81001 URINALYSIS AUTO W/SCOPE: CPT

## 2020-08-02 PROCEDURE — 99284 EMERGENCY DEPT VISIT MOD MDM: CPT

## 2020-08-02 PROCEDURE — 80053 COMPREHEN METABOLIC PANEL: CPT

## 2020-08-02 PROCEDURE — 85025 COMPLETE CBC W/AUTO DIFF WBC: CPT

## 2020-08-02 PROCEDURE — 90791 PSYCH DIAGNOSTIC EVALUATION: CPT

## 2020-08-02 PROCEDURE — 36415 COLL VENOUS BLD VENIPUNCTURE: CPT

## 2020-08-03 VITALS
DIASTOLIC BLOOD PRESSURE: 86 MMHG | RESPIRATION RATE: 15 BRPM | BODY MASS INDEX: 29.38 KG/M2 | HEIGHT: 63 IN | HEART RATE: 70 BPM | TEMPERATURE: 98 F | SYSTOLIC BLOOD PRESSURE: 124 MMHG | OXYGEN SATURATION: 98 % | WEIGHT: 165.8 LBS

## 2020-08-03 LAB
ALBUMIN SERPL-MCNC: 3.3 G/DL (ref 3.5–5)
ALBUMIN/GLOB SERPL: 0.9 {RATIO} (ref 1.1–2.2)
ALP SERPL-CCNC: 94 U/L (ref 45–117)
ALT SERPL-CCNC: 42 U/L (ref 12–78)
AMPHET UR QL SCN: NEGATIVE
ANION GAP SERPL CALC-SCNC: 8 MMOL/L (ref 5–15)
AST SERPL-CCNC: 23 U/L (ref 15–37)
BARBITURATES UR QL SCN: NEGATIVE
BENZODIAZ UR QL: NEGATIVE
BILIRUB SERPL-MCNC: 0.3 MG/DL (ref 0.2–1)
BUN SERPL-MCNC: 23 MG/DL (ref 6–20)
BUN/CREAT SERPL: 17 (ref 12–20)
CALCIUM SERPL-MCNC: 9.2 MG/DL (ref 8.5–10.1)
CANNABINOIDS UR QL SCN: NEGATIVE
CHLORIDE SERPL-SCNC: 113 MMOL/L (ref 97–108)
CO2 SERPL-SCNC: 21 MMOL/L (ref 21–32)
COCAINE UR QL SCN: NEGATIVE
CREAT SERPL-MCNC: 1.38 MG/DL (ref 0.55–1.02)
DRUG SCRN COMMENT,DRGCM: NORMAL
GLOBULIN SER CALC-MCNC: 3.5 G/DL (ref 2–4)
GLUCOSE SERPL-MCNC: 147 MG/DL (ref 65–100)
METHADONE UR QL: NEGATIVE
OPIATES UR QL: NEGATIVE
PCP UR QL: NEGATIVE
POTASSIUM SERPL-SCNC: 3.8 MMOL/L (ref 3.5–5.1)
PROT SERPL-MCNC: 6.8 G/DL (ref 6.4–8.2)
SODIUM SERPL-SCNC: 142 MMOL/L (ref 136–145)

## 2020-08-03 NOTE — ED NOTES
I have reviewed discharge instructions with the patient. The patient verbalized understanding. Ambulated out of the department with sister, and a steady gait in no acute distress.

## 2020-08-03 NOTE — DISCHARGE INSTRUCTIONS
Continue all your medicines. Return to ER for any thoughts of harming yourself or others, plan to harm yourself. Keep appointment on Tuesday with your doctor. Bipolar Disorder: Care Instructions  Your Care Instructions     Bipolar disorder is an illness that causes extreme mood changes, from times of very high energy (manic episodes) to times of depression. But many people with bipolar disorder show only the symptoms of depression. These moods may cause problems with your work, school, family life, friendships, and how well you function. This disease is also called manic-depression. There is no cure for bipolar disorder, but it can be helped with medicines. Counseling may also help. It is important to take your medicines exactly as prescribed, even when you feel well. You may need lifelong treatment. Follow-up care is a key part of your treatment and safety. Be sure to make and go to all appointments, and call your doctor if you are having problems. It's also a good idea to know your test results and keep a list of the medicines you take. How can you care for yourself at home? · Be safe with medicines. Take your medicines exactly as prescribed. Do not stop or change a medicine without talking to your doctor first. Shara Barclay and your doctor may need to try different combinations of medicines to find what works for you. · Take your medicines on schedule to keep your moods even. When you feel good, you may think that you do not need your medicines. But it is important to keep taking them. · Go to your counseling sessions. Call and talk with your counselor if you can't go to a session or if you don't think the sessions are helping. Do not just stop going. · Get at least 30 minutes of activity on most days of the week. Walking is a good choice. You also may want to do other things, such as running, swimming, or cycling. · Get enough sleep. Keep your room dark and quiet.  Try to go to bed at the same time every night.  · Eat a healthy diet. This includes whole grains, dairy, fruits, vegetables, and protein. Eat foods from each of these groups. · Try to lower your stress. Manage your time, build a strong support system, and lead a healthy lifestyle. To lower your stress, try physical activity, slow deep breathing, or getting a massage. · Do not use alcohol or illegal drugs. · Learn the early signs of your mood changes. You can then take steps to help yourself feel better. · Ask for help from friends and family when you need it. You may need help with daily chores when you are depressed. When you are manic, you may need support to control your high energy levels. What should you do if someone in your family has bipolar disorder? · Learn about the disease and the signs that it is getting worse. · Remind your family member that you love him or her. · Make a plan with all family members about how to take care of your loved one when his or her symptoms are bad. · Talk about your fears and concerns and those of other family members. Seek counseling if needed. · Do not focus attention only on the person who is in treatment. · Remind yourself that it will take time for changes to occur. · Do not blame yourself for the disease. · Know your legal rights and the legal rights of your family member. Support groups or counselors can help you with this information. · Take care of yourself. Keep up with your own interests, such as your career, hobbies, and friends. Use exercise, positive self-talk, deep breathing, and other relaxing exercises to help lower your stress. · Give yourself time to grieve. You may need to deal with emotions such as anger, fear, and frustration. After you work through your feelings, you will be better able to care for yourself and your family. · If you are having a hard time with your feelings or with your relationship with your family member, talk with a counselor.   When should you call for help?   [id number] anytime you think you may need emergency care. For example, call if:  · You feel like hurting yourself or someone else. · Someone who has bipolar disorder displays dangerous behavior, and you think the person might hurt himself or herself or someone else. Call your doctor now or seek immediate medical care if:  · You hear voices. · Someone you know has bipolar disorder and talks about suicide. Keep the numbers for these national suicide hotlines: 9-391-956-TALK (6-925.870.6751) and 7-611-EXQESKF (1-401.146.1641). If a suicide threat seems real, with a specific plan and a way to carry it out, stay with the person, or ask someone you trust to stay with the person, until you can get help. · Someone you know has bipolar disorder and:  ? Starts to give away possessions. ? Is using illegal drugs or drinking alcohol heavily. ? Talks or writes about death, including writing suicide notes or talking about guns, knives, or pills. ? Talks or writes about hurting someone else. ? Starts to spend a lot of time alone. ? Acts very aggressively or suddenly appears calm. ? Talks about beliefs that are not based in reality (delusions). Watch closely for changes in your health, and be sure to contact your doctor if:  · You cannot go to your counseling sessions. Where can you learn more? Go to http://bubba-maryse.info/  Enter K052 in the search box to learn more about \"Bipolar Disorder: Care Instructions. \"  Current as of: January 31, 2020               Content Version: 12.5  © 3060-8698 [employer]. Care instructions adapted under license by [employer] (which disclaims liability or warranty for this information). If you have questions about a medical condition or this instruction, always ask your healthcare professional. [address] any warranty or liability for your use of this information.

## 2020-08-03 NOTE — ED PROVIDER NOTES
53-year-old female presents emergency room for mental health evaluation. Patient has a history of bipolar disease. Patient was discharged from [facility] 2 days ago. Patient at that time was having visual hallucinations. Patient sister reports the patient has been calling 911 since discharge. Sister reports patient is continuing to have visual hallucinations. Patient however is denying hallucinations. She denies suicidal ideation or homicidal ideation. Pt does believe that paranormal activity is occurring in her sisters house and that she has to take care of it. Patient reports compliance with her medicines. No physical complaints. No chest pain, shortness of breath difficulty breathing. No abdominal pain, nausea or vomiting. No urinary symptoms. Mental Health Problem           Past Medical History:   Diagnosis Date    B[facility] affective ([facility]     [facility]     Hypertension     Lymphoma ([facility]     Psychiatric disorder        History reviewed. No pertinent surgical history. History reviewed. No pertinent family history.     Social History     Socioeconomic History    Marital status: SINGLE     Spouse name: Not on file    Number of children: Not on file    Years of education: Not on file    Highest education level: Not on file   Occupational History    Not on file   Social Needs    Financial resource strain: Not on file    Food insecurity     Worry: Not on file     Inability: Not on file    Transportation needs     Medical: Not on file     Non-medical: Not on file   Tobacco Use    Smoking status: Former Smoker     Last attempt to quit: 1988     Years since quittin.6    Smokeless tobacco: Never Used   Substance and Sexual Activity    Alcohol use: Never     Frequency: Never    Drug use: Never    Sexual activity: Not on file   Lifestyle    Physical activity     Days per week: Not on file     Minutes per session: Not on file    Stress: Not on file   Relationships    Social connections     Talks on phone: Not on file     Gets together: Not on file     Attends Sabianist service: Not on file     Active member of club or organization: Not on file     Attends meetings of clubs or organizations: Not on file     Relationship status: Not on file    Intimate partner violence     Fear of current or ex partner: Not on file     Emotionally abused: Not on file     Physically abused: Not on file     Forced sexual activity: Not on file   Other Topics Concern    Not on file   Social History Narrative    Not on file         ALLERGIES: Acetaminophen; Codeine; Other medication; Penicillins; Statins-hmg-coa reductase inhibitors; and Sulfa (sulfonamide antibiotics)    Review of Systems    Vitals:    08/02/20 2012   BP: 136/66   Pulse: 73   Resp: 16   Temp: 98.4 °F (36.9 °C)   SpO2: 99%   Weight: 75.2 kg (165 lb 12.8 oz)   Height: 5' 3\" (1.6 m)            Physical Exam  Vitals signs and nursing note reviewed. Constitutional:       General: She is not in acute distress. Appearance: Normal appearance. She is normal weight. She is not toxic-appearing. HENT:      Head: Normocephalic and atraumatic. Nose: Nose normal. No congestion. Eyes:      Conjunctiva/sclera: Conjunctivae normal.      Pupils: Pupils are equal, round, and reactive to light. Neck:      Musculoskeletal: Normal range of motion and neck supple. Cardiovascular:      Rate and Rhythm: Normal rate and regular rhythm. Pulmonary:      Effort: Pulmonary effort is normal.      Breath sounds: Normal breath sounds. Abdominal:      General: There is no distension. Palpations: There is no mass. Tenderness: There is no abdominal tenderness. There is no right CVA tenderness, left CVA tenderness, guarding or rebound. Hernia: No hernia is present. Musculoskeletal: Normal range of motion. Skin:     General: Skin is warm and dry. Neurological:      General: No focal deficit present.       Mental Status: She is alert and oriented to person, place, and time. Psychiatric:         Thought Content: Thought content does not include homicidal ideation. Thought content does not include homicidal or suicidal plan. Comments: Pressured speech          MDM  Number of Diagnoses or Management Options  Bipolar 1 disorder [facility]):   Diagnosis management comments: 78-year-old female presenting with sister for continued hallucinations. Patient is labile and easily irritated at her sister. She is redirectable. No physical complaints. Lungs are clear, abdomen is soft and nontender. She is not tachycardic tachypneic or hypoxic. Plan: Recheck labs BSMART consult    1:45 AM  Pt seen by Germaine De Leon. He has discussed with psychiatrist. Pt to have TDO evaluation. 2:58 AM  [facility] does not see need for evaluation. rediscussed with psychiatrist.  Pt to be discharged. Patient's results have been reviewed with them. Patient and/or family have verbally conveyed their understanding and agreement of the patient's signs, symptoms, diagnosis, treatment and prognosis and additionally agree to follow up as recommended or return to the Emergency Room should their condition change prior to follow-up. Discharge instructions have also been provided to the patient with some educational information regarding their diagnosis as well a list of reasons why they would want to return to the ER prior to their follow-up appointment should their condition change. Amount and/or Complexity of Data Reviewed  Discuss the patient with other providers: yes (ER attending-Fish)    Patient Progress  Patient progress: stable         Procedures    Pt case including HPI, PE, and all available lab and radiology results has been discussed with attending physician. Opportunity to evaluate patient has been provided to ER attending. Discharge and prescription plan has been agreed upon.

## 2020-08-03 NOTE — ED TRIAGE NOTES
Patient arrives to the ED via her sister after being discharged from [facility] on  Friday for being aggressive to her family, per her sister patient is having visual hallucinations regarding her dead father and repeatedly calling 46. Patient denies these actions.
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
## 2020-08-03 NOTE — ED NOTES
Assumed care of patient. BSMART at bedside speaking with patient , family member remains at bedside. 0200:  Patient states she wants to go home and does not want to stay. BSMART speaking with [facility]. 80: [facility] not pursuing a TDO at this time.

## 2022-03-19 PROBLEM — F31.10 BIPOLAR DISEASE, MANIC (HCC): Status: ACTIVE | Noted: 2020-07-21

## 2023-05-11 RX ORDER — LITHIUM CARBONATE 300 MG/1
CAPSULE ORAL
COMMUNITY
Start: 2020-07-31

## 2023-05-11 RX ORDER — MOMETASONE FUROATE 50 UG/1
2 SPRAY, METERED NASAL DAILY PRN
COMMUNITY

## 2023-05-11 RX ORDER — OMEPRAZOLE 20 MG/1
CAPSULE, DELAYED RELEASE ORAL DAILY PRN
COMMUNITY

## 2023-05-11 RX ORDER — MONTELUKAST SODIUM 10 MG/1
TABLET ORAL
COMMUNITY

## 2023-05-11 RX ORDER — OLANZAPINE 5 MG/1
TABLET ORAL 2 TIMES DAILY
COMMUNITY
Start: 2020-07-31

## 2023-05-11 RX ORDER — THIOTHIXENE 2 MG/1
CAPSULE ORAL EVERY EVENING
COMMUNITY
Start: 2020-07-31

## 2023-05-11 RX ORDER — LATANOPROST 50 UG/ML
1 SOLUTION/ DROPS OPHTHALMIC
COMMUNITY

## 2023-05-11 RX ORDER — LITHIUM CARBONATE 150 MG/1
CAPSULE ORAL
COMMUNITY
Start: 2020-07-31

## 2023-05-11 RX ORDER — AMLODIPINE BESYLATE 5 MG/1
TABLET ORAL DAILY
COMMUNITY
Start: 2020-07-31

## 2023-05-11 RX ORDER — LANOLIN ALCOHOL/MO/W.PET/CERES
CREAM (GRAM) TOPICAL
COMMUNITY
Start: 2020-07-31

## 2023-05-11 RX ORDER — METOPROLOL SUCCINATE 100 MG/1
TABLET, EXTENDED RELEASE ORAL DAILY
COMMUNITY
Start: 2020-07-31